# Patient Record
Sex: FEMALE | Race: WHITE | HISPANIC OR LATINO | Employment: FULL TIME | URBAN - METROPOLITAN AREA
[De-identification: names, ages, dates, MRNs, and addresses within clinical notes are randomized per-mention and may not be internally consistent; named-entity substitution may affect disease eponyms.]

---

## 2017-11-30 ENCOUNTER — GENERIC CONVERSION - ENCOUNTER (OUTPATIENT)
Dept: OTHER | Facility: OTHER | Age: 22
End: 2017-11-30

## 2017-12-19 ENCOUNTER — LAB CONVERSION - ENCOUNTER (OUTPATIENT)
Dept: FAMILY MEDICINE CLINIC | Facility: CLINIC | Age: 22
End: 2017-12-19

## 2017-12-19 ENCOUNTER — GENERIC CONVERSION - ENCOUNTER (OUTPATIENT)
Dept: OTHER | Facility: OTHER | Age: 22
End: 2017-12-19

## 2017-12-19 DIAGNOSIS — Z01.419 ENCOUNTER FOR GYNECOLOGICAL EXAMINATION WITHOUT ABNORMAL FINDING: ICD-10-CM

## 2017-12-21 ENCOUNTER — GENERIC CONVERSION - ENCOUNTER (OUTPATIENT)
Dept: OTHER | Facility: OTHER | Age: 22
End: 2017-12-21

## 2017-12-21 ENCOUNTER — LAB CONVERSION - ENCOUNTER (OUTPATIENT)
Dept: OTHER | Facility: OTHER | Age: 22
End: 2017-12-21

## 2017-12-21 LAB
ADDITIONAL INFORMATION (HISTORICAL): NORMAL
ADEQUACY: (HISTORICAL): NORMAL
CLINICAL COMMENT (HISTORICAL): NORMAL
COMMENT (HISTORICAL): NORMAL
CYTOTECHNOLOGIST: (HISTORICAL): NORMAL
INTERPRETATION (HISTORICAL): NORMAL
LMP (HISTORICAL): NORMAL
PREV. BX: (HISTORICAL): NORMAL
PREV. PAP (HISTORICAL): NORMAL
SOURCE (HISTORICAL): NORMAL

## 2018-01-22 VITALS
DIASTOLIC BLOOD PRESSURE: 82 MMHG | SYSTOLIC BLOOD PRESSURE: 122 MMHG | TEMPERATURE: 98.5 F | WEIGHT: 129 LBS | BODY MASS INDEX: 22.86 KG/M2 | RESPIRATION RATE: 16 BRPM | OXYGEN SATURATION: 98 % | HEIGHT: 63 IN | HEART RATE: 76 BPM

## 2018-01-23 NOTE — RESULT NOTES
Verified Results  SURESWAB(R) CHLAMYDIA/ Lanice Agreste RNA, TMA [id number] 12:00AM Julieta Mijares     Test Name Result Flag Reference   CHLAMYDIA TRACHOMATIS$RNA, TMA NOT DETECTED  NOT DETECTED   NEISSERIA [address], TMA NOT DETECTED  NOT DETECTED   This test was performed using the [address]  ([facility] )  The analytical performance characteristics of this   assay, when used to test SurePath specimens have  been determined by [employer] TIS PAP REFLEX HPV mRNA E6/E7 [id number] 12:00AM Julieta Erickson     Test Name Result Flag Reference   CLINICAL INFORMATION: None given     LMP: NONE GIVEN     PREV  PAP: NONE GIVEN     PREV  BX: NONE GIVEN     SOURCE: Cervix     STATEMENT OF ADEQUACY:      Satisfactory for evaluation  Endocervical/transformation zone component absent  Age and/or menstrual status not provided   INTERPRETATION/RESULT:      Negative for intraepithelial lesion or malignancy  COMMENT:      Tip of collection device in vial  This Pap test has been evaluated with computer  assisted technology     CYTOTECHNOLOGIST:      DAVID WYNNE(ASCP)  CT screening location: [facility], [address] [place] [address]

## 2018-01-24 VITALS
RESPIRATION RATE: 16 BRPM | OXYGEN SATURATION: 98 % | WEIGHT: 126.38 LBS | BODY MASS INDEX: 22.39 KG/M2 | HEART RATE: 76 BPM | TEMPERATURE: 98.2 F | SYSTOLIC BLOOD PRESSURE: 120 MMHG | DIASTOLIC BLOOD PRESSURE: 80 MMHG | HEIGHT: 63 IN

## 2018-05-04 DIAGNOSIS — Z30.41 SURVEILLANCE FOR BIRTH CONTROL, ORAL CONTRACEPTIVES: Primary | ICD-10-CM

## 2018-05-04 RX ORDER — LEVONORGESTREL AND ETHINYL ESTRADIOL 0.1-0.02MG
KIT ORAL
Qty: 90 TABLET | Refills: 1 | Status: SHIPPED | OUTPATIENT
Start: 2018-05-04 | End: 2018-06-01 | Stop reason: SDUPTHER

## 2018-06-01 DIAGNOSIS — Z30.41 SURVEILLANCE FOR BIRTH CONTROL, ORAL CONTRACEPTIVES: ICD-10-CM

## 2018-06-01 RX ORDER — LEVONORGESTREL AND ETHINYL ESTRADIOL 0.1-0.02MG
1 KIT ORAL DAILY
Qty: 86 TABLET | Refills: 1 | Status: SHIPPED | OUTPATIENT
Start: 2018-06-01 | End: 2018-11-11 | Stop reason: SDUPTHER

## 2018-11-11 DIAGNOSIS — Z30.41 SURVEILLANCE FOR BIRTH CONTROL, ORAL CONTRACEPTIVES: ICD-10-CM

## 2018-11-11 RX ORDER — LEVONORGESTREL AND ETHINYL ESTRADIOL 0.1-0.02MG
KIT ORAL
Qty: 86 TABLET | Refills: 1 | Status: SHIPPED | OUTPATIENT
Start: 2018-11-11 | End: 2019-05-23 | Stop reason: SDUPTHER

## 2018-11-29 RX ORDER — LEVONORGESTREL AND ETHINYL ESTRADIOL 0.1-0.02MG
1 KIT ORAL DAILY
COMMUNITY
End: 2019-05-07

## 2018-12-06 ENCOUNTER — OFFICE VISIT (OUTPATIENT)
Dept: FAMILY MEDICINE CLINIC | Facility: CLINIC | Age: 23
End: 2018-12-06
Payer: COMMERCIAL

## 2018-12-06 VITALS
HEIGHT: 62 IN | OXYGEN SATURATION: 97 % | TEMPERATURE: 98.6 F | DIASTOLIC BLOOD PRESSURE: 90 MMHG | HEART RATE: 99 BPM | WEIGHT: 131 LBS | RESPIRATION RATE: 17 BRPM | BODY MASS INDEX: 24.11 KG/M2 | SYSTOLIC BLOOD PRESSURE: 130 MMHG

## 2018-12-06 DIAGNOSIS — R82.90 ABNORMAL URINALYSIS: ICD-10-CM

## 2018-12-06 DIAGNOSIS — M54.42 CHRONIC BILATERAL LOW BACK PAIN WITH BILATERAL SCIATICA: Primary | ICD-10-CM

## 2018-12-06 DIAGNOSIS — M54.41 CHRONIC BILATERAL LOW BACK PAIN WITH BILATERAL SCIATICA: Primary | ICD-10-CM

## 2018-12-06 DIAGNOSIS — R10.32 INTERMITTENT LEFT LOWER QUADRANT ABDOMINAL PAIN: ICD-10-CM

## 2018-12-06 DIAGNOSIS — G89.29 CHRONIC BILATERAL LOW BACK PAIN WITH BILATERAL SCIATICA: Primary | ICD-10-CM

## 2018-12-06 PROBLEM — M54.50 CHRONIC LOW BACK PAIN WITHOUT SCIATICA: Status: ACTIVE | Noted: 2018-12-06

## 2018-12-06 LAB
SL AMB  POCT GLUCOSE, UA: ABNORMAL
SL AMB LEUKOCYTE ESTERASE,UA: ABNORMAL
SL AMB POCT BILIRUBIN,UA: ABNORMAL
SL AMB POCT BLOOD,UA: ABNORMAL
SL AMB POCT CLARITY,UA: ABNORMAL
SL AMB POCT COLOR,UA: YELLOW
SL AMB POCT KETONES,UA: ABNORMAL
SL AMB POCT NITRITE,UA: ABNORMAL
SL AMB POCT PH,UA: 7
SL AMB POCT SPECIFIC GRAVITY,UA: 1.01
SL AMB POCT URINE PROTEIN: ABNORMAL
SL AMB POCT UROBILINOGEN: ABNORMAL

## 2018-12-06 PROCEDURE — 81002 URINALYSIS NONAUTO W/O SCOPE: CPT | Performed by: FAMILY MEDICINE

## 2018-12-06 PROCEDURE — 99214 OFFICE O/P EST MOD 30 MIN: CPT | Performed by: FAMILY MEDICINE

## 2018-12-06 RX ORDER — IBUPROFEN 600 MG/1
600 TABLET ORAL EVERY 6 HOURS PRN
Qty: 90 TABLET | Refills: 0 | Status: SHIPPED | OUTPATIENT
Start: 2018-12-06 | End: 2020-04-20

## 2018-12-06 RX ORDER — SULFAMETHOXAZOLE AND TRIMETHOPRIM 800; 160 MG/1; MG/1
1 TABLET ORAL EVERY 12 HOURS SCHEDULED
Qty: 14 TABLET | Refills: 0 | Status: SHIPPED | OUTPATIENT
Start: 2018-12-06 | End: 2018-12-13

## 2018-12-06 NOTE — PROGRESS NOTES
Assessment/Plan:       Diagnoses and all orders for this visit:    Chronic bilateral low back pain with bilateral sciatica  -     POCT urine dip  -     Ambulatory referral to Physical Therapy; Future  -     ibuprofen (MOTRIN) 600 mg tablet; Take 1 tablet (600 mg total) by mouth every 6 (six) hours as needed for mild pain or moderate pain    Intermittent left lower quadrant abdominal pain  -     sulfamethoxazole-trimethoprim (BACTRIM DS) 800-160 mg per tablet; Take 1 tablet by mouth every 12 (twelve) hours for 7 days  -     ibuprofen (MOTRIN) 600 mg tablet; Take 1 tablet (600 mg total) by mouth every 6 (six) hours as needed for mild pain or moderate pain    Abnormal urinalysis  -     sulfamethoxazole-trimethoprim (BACTRIM DS) 800-160 mg per tablet; Take 1 tablet by mouth every 12 (twelve) hours for 7 days          Subjective:   Chief Complaint   Patient presents with    Back Pain     Lower back pain since the summer  Pain is almost every day  Some days pain radiates down legs   Abdominal Pain     Sharp pains lower abdomin for past couple weeks         Patient ID: Aristides Uriarte is a 26 y o  female      Here with her mom c/o abd pain couple of weeks, lower stomach, then also past couple of days sharp and burning pains on the left side  No meds tried  Low back pain for several months- since the summer- 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, sx gradual onset, first noticed when she would bend down, and has been worsening progressively  States she bought a back brace which helps, but she still gets pains  Also states she has been getting help so she doesn't have to do the heavy lifting at work  No meds used  Per chart review- pt is due for PAP- last was 12/2017, and is on OCP  bp elevated today=- has been normal at prior visits here per chart        The following portions of the patient's history were reviewed and updated as appropriate: allergies, current medications, past family history, past medical history, past social history, past surgical history and problem list     Review of Systems   Constitutional: Negative  Respiratory: Negative  Cardiovascular: Negative  Gastrointestinal: Negative for abdominal distention, blood in stool, constipation, diarrhea, nausea, rectal pain and vomiting  Per hpi   Genitourinary: Negative for decreased urine volume, difficulty urinating, dysuria, frequency, hematuria and urgency  Musculoskeletal: Negative for gait problem and joint swelling  Per hpi   Neurological: Negative  Objective:      /90 (BP Location: Left arm, Patient Position: Sitting, Cuff Size: Adult)   Pulse 99   Temp 98 6 °F (37 °C) (Tympanic)   Resp 17   Ht 5' 1 75" (1 568 m)   Wt 59 4 kg (131 lb)   SpO2 97%   BMI 24 15 kg/m²          Physical Exam   Constitutional: She is oriented to person, place, and time  She appears well-developed and well-nourished  She is cooperative  Non-toxic appearance  She does not have a sickly appearance  She does not appear ill  No distress  HENT:   Mouth/Throat: Uvula is midline, oropharynx is clear and moist and mucous membranes are normal    Neck: Trachea normal and normal range of motion  Neck supple  No thyroid mass and no thyromegaly present  Cardiovascular: Normal rate, regular rhythm, normal heart sounds and normal pulses  Pulmonary/Chest: Effort normal and breath sounds normal    Abdominal: Soft  Bowel sounds are normal  She exhibits no distension, no abdominal bruit and no mass  There is no hepatosplenomegaly  There is tenderness (mild b/l lower quad)  There is no rigidity, no rebound, no guarding, no CVA tenderness, no tenderness at McBurney's point and negative Ramirez's sign  No hernia  Musculoskeletal:        Thoracic back: She exhibits normal range of motion, no tenderness, no bony tenderness, no swelling, no edema, no deformity and no spasm          Lumbar back: She exhibits decreased range of motion, tenderness and pain (with flexion)  She exhibits no bony tenderness, no swelling, no edema, no deformity and no spasm  B/l lateral lower lumbar and Right si joint tenderness  +SLR Left   Lymphadenopathy:     She has no cervical adenopathy  Right: No inguinal and no supraclavicular adenopathy present  Left: No inguinal and no supraclavicular adenopathy present  Neurological: She is alert and oriented to person, place, and time  She has normal strength and normal reflexes  She displays no atrophy and no tremor  No sensory deficit  Gait normal    Skin: Skin is warm and dry  She is not diaphoretic  No cyanosis  No pallor  Psychiatric: Her behavior is normal  Her mood appears anxious (mildly)  Her affect is not angry, not blunt, not labile and not inappropriate  She does not exhibit a depressed mood

## 2019-01-17 ENCOUNTER — OFFICE VISIT (OUTPATIENT)
Dept: FAMILY MEDICINE CLINIC | Facility: CLINIC | Age: 24
End: 2019-01-17
Payer: COMMERCIAL

## 2019-01-17 VITALS
OXYGEN SATURATION: 99 % | WEIGHT: 133 LBS | HEART RATE: 91 BPM | TEMPERATURE: 97.7 F | HEIGHT: 63 IN | BODY MASS INDEX: 23.57 KG/M2 | DIASTOLIC BLOOD PRESSURE: 66 MMHG | SYSTOLIC BLOOD PRESSURE: 114 MMHG

## 2019-01-17 DIAGNOSIS — G89.29 CHRONIC BILATERAL LOW BACK PAIN WITH BILATERAL SCIATICA: Primary | ICD-10-CM

## 2019-01-17 DIAGNOSIS — M54.32 BILATERAL SCIATICA: ICD-10-CM

## 2019-01-17 DIAGNOSIS — R10.32 INTERMITTENT LEFT LOWER QUADRANT ABDOMINAL PAIN: ICD-10-CM

## 2019-01-17 DIAGNOSIS — M54.41 CHRONIC BILATERAL LOW BACK PAIN WITH BILATERAL SCIATICA: Primary | ICD-10-CM

## 2019-01-17 DIAGNOSIS — M54.42 CHRONIC BILATERAL LOW BACK PAIN WITH BILATERAL SCIATICA: Primary | ICD-10-CM

## 2019-01-17 DIAGNOSIS — M54.31 BILATERAL SCIATICA: ICD-10-CM

## 2019-01-17 DIAGNOSIS — R31.29 MICROSCOPIC HEMATURIA: ICD-10-CM

## 2019-01-17 LAB
SL AMB  POCT GLUCOSE, UA: ABNORMAL
SL AMB LEUKOCYTE ESTERASE,UA: ABNORMAL
SL AMB POCT BILIRUBIN,UA: ABNORMAL
SL AMB POCT BLOOD,UA: ABNORMAL
SL AMB POCT CLARITY,UA: CLEAR
SL AMB POCT COLOR,UA: YELLOW
SL AMB POCT KETONES,UA: ABNORMAL
SL AMB POCT NITRITE,UA: ABNORMAL
SL AMB POCT PH,UA: 6
SL AMB POCT SPECIFIC GRAVITY,UA: 1.02
SL AMB POCT URINE PROTEIN: ABNORMAL
SL AMB POCT UROBILINOGEN: ABNORMAL

## 2019-01-17 PROCEDURE — 99214 OFFICE O/P EST MOD 30 MIN: CPT | Performed by: FAMILY MEDICINE

## 2019-01-17 PROCEDURE — 81002 URINALYSIS NONAUTO W/O SCOPE: CPT | Performed by: FAMILY MEDICINE

## 2019-01-17 PROCEDURE — 87086 URINE CULTURE/COLONY COUNT: CPT | Performed by: FAMILY MEDICINE

## 2019-01-17 NOTE — PROGRESS NOTES
Assessment/Plan:         Diagnoses and all orders for this visit:    Chronic bilateral low back pain with bilateral sciatica  -     Ambulatory referral to Orthopedic Surgery; Future  -     MRI lumbar spine wo contrast; Future    Bilateral sciatica  Comments:  new problem, ordered MRI and ortho eval   Orders:  -     Ambulatory referral to Orthopedic Surgery; Future  -     MRI lumbar spine wo contrast; Future    Intermittent left lower quadrant abdominal pain  Comments:  with heme on urine dip today, culture sent  Orders:  -     MRI lumbar spine wo contrast; Future  -     POCT urine dip  -     Urine culture; Future  -     Urine culture    Microscopic hematuria  Comments:  urine sent for culture  Orders:  -     Urine culture; Future  -     Urine culture          Subjective:   Chief Complaint   Patient presents with    Well Check     unable to go to pt because of insurance/ but had accupuncture        Patient ID: Ronel Arias is a 22 y o  female  Today's visit is f/u of last month- seen for chronic back pain sx without sciatica  Was rx'd PT and NSAID, but pt states today that insurance didn't cover PT, and she couldn't pay for it out of pocket, so went for acupuncture- feels better the day she goes, "a little relief,"  but then starts hurting the next day  Has had 4 acupuncture sessions over past month  Then 2 days ago "pain was very bad while at work, even hurt to stand, and for first time it hurt to walk and pain shooting down my legs," and states still getting sharp pains in the front (lower abd on the left)  Pain in legs persists since 2 days ago, only relieved when she is in bed at night, all worse on the left  LMP was 01/09/2019        The following portions of the patient's history were reviewed and updated as appropriate: allergies, current medications, past family history, past medical history, past social history, past surgical history and problem list     Review of Systems   Constitutional: Negative  Gastrointestinal: Negative  Genitourinary: Negative for decreased urine volume, difficulty urinating, dysuria, flank pain, frequency, hematuria, menstrual problem and vaginal discharge  Musculoskeletal: Negative for joint swelling  Per hpi   Skin: Negative  Neurological: Negative  Objective:      /66 (BP Location: Left arm, Patient Position: Sitting, Cuff Size: Standard)   Pulse 91   Temp 97 7 °F (36 5 °C) (Tympanic)   Ht 5' 2 5" (1 588 m)   Wt 60 3 kg (133 lb)   SpO2 99%   BMI 23 94 kg/m²          Physical Exam   Constitutional: She is oriented to person, place, and time  She appears well-developed and well-nourished  She is cooperative  Non-toxic appearance  She does not have a sickly appearance  She does not appear ill  No distress  Musculoskeletal:        Right hip: Normal         Left hip: Normal         Lumbar back: She exhibits decreased range of motion and tenderness  She exhibits no bony tenderness, no swelling, no edema, no deformity and no spasm  +SLR left >right   Neurological: She is alert and oriented to person, place, and time  She has normal strength and normal reflexes  She displays no atrophy and no tremor  No sensory deficit  Gait normal    Skin: Skin is warm and dry  She is not diaphoretic  No pallor  Psychiatric: She has a normal mood and affect  Her behavior is normal    Nursing note and vitals reviewed

## 2019-01-18 LAB — BACTERIA UR CULT: NORMAL

## 2019-01-21 ENCOUNTER — TELEPHONE (OUTPATIENT)
Dept: FAMILY MEDICINE CLINIC | Facility: CLINIC | Age: 24
End: 2019-01-21

## 2019-02-04 ENCOUNTER — TELEPHONE (OUTPATIENT)
Dept: FAMILY MEDICINE CLINIC | Facility: CLINIC | Age: 24
End: 2019-02-04

## 2019-02-04 NOTE — TELEPHONE ENCOUNTER
Father called to follow up with MRI results from 1/29/2019   She went to [facility], as of now no results back yet

## 2019-02-06 NOTE — TELEPHONE ENCOUNTER
Mom was in the office for an appt today  She questioned the results of MRI for patient         Scanned under Media tab

## 2019-02-27 ENCOUNTER — OFFICE VISIT (OUTPATIENT)
Dept: OBGYN CLINIC | Facility: CLINIC | Age: 24
End: 2019-02-27
Payer: COMMERCIAL

## 2019-02-27 VITALS
BODY MASS INDEX: 24.48 KG/M2 | HEART RATE: 76 BPM | HEIGHT: 62 IN | WEIGHT: 133 LBS | DIASTOLIC BLOOD PRESSURE: 79 MMHG | SYSTOLIC BLOOD PRESSURE: 118 MMHG

## 2019-02-27 DIAGNOSIS — M54.31 BILATERAL SCIATICA: ICD-10-CM

## 2019-02-27 DIAGNOSIS — M54.42 CHRONIC BILATERAL LOW BACK PAIN WITH BILATERAL SCIATICA: ICD-10-CM

## 2019-02-27 DIAGNOSIS — G89.29 CHRONIC BILATERAL LOW BACK PAIN WITH BILATERAL SCIATICA: ICD-10-CM

## 2019-02-27 DIAGNOSIS — M54.41 CHRONIC BILATERAL LOW BACK PAIN WITH BILATERAL SCIATICA: ICD-10-CM

## 2019-02-27 DIAGNOSIS — M54.32 BILATERAL SCIATICA: ICD-10-CM

## 2019-02-27 PROCEDURE — 99203 OFFICE O/P NEW LOW 30 MIN: CPT | Performed by: ORTHOPAEDIC SURGERY

## 2019-02-27 NOTE — ASSESSMENT & PLAN NOTE
Patient presents for evaluation of several months duration of lower back and leg pains  Fortunately she reports significant improvement in the last several weeks  She now describes 2/10 pain  Pain is primarily localized to the lumbosacral spine bilaterally  Denies any distal leg involvement today  Does not report incontinence of bowel or bladder  Denies alonso weakness  She is yet to try physical therapy or chiropractor  She takes ibuprofen as needed  On physical exam she appears stated age in well-developed in no acute distress  Her gait is normal   She has good lumbar mobility with flexion and extension  She is tender to palpation of the lumbosacral spine minimally  5/5 strength L2-S1 bilaterally  Sensation is grossly intact to light touch  Negative modified straight leg raise bilaterally  Reflexes are 2+ at L4 and S1 symmetrically  She does not present with the MRI of her lumbar spine from outside facility for my review  Assessment and plan    Myofascial back pain on today's exam   No gross neurological deficits  No evidence of radicular symptoms currently  Physical therapy  Follow-up p r n

## 2019-02-27 NOTE — PROGRESS NOTES
Assessment/Plan:    Chronic bilateral low back pain with bilateral sciatica  Patient presents for evaluation of several months duration of lower back and leg pains  Fortunately she reports significant improvement in the last several weeks  She now describes 2/10 pain  Pain is primarily localized to the lumbosacral spine bilaterally  Denies any distal leg involvement today  Does not report incontinence of bowel or bladder  Denies alonso weakness  She is yet to try physical therapy or chiropractor  She takes ibuprofen as needed  On physical exam she appears stated age in well-developed in no acute distress  Her gait is normal   She has good lumbar mobility with flexion and extension  She is tender to palpation of the lumbosacral spine minimally  5/5 strength L2-S1 bilaterally  Sensation is grossly intact to light touch  Negative modified straight leg raise bilaterally  Reflexes are 2+ at L4 and S1 symmetrically  She does not present with the MRI of her lumbar spine from outside facility for my review  Assessment and plan    Myofascial back pain on today's exam   No gross neurological deficits  No evidence of radicular symptoms currently  Physical therapy  Follow-up p r n  · Low back pain with anteromedial thigh and groin pain  · Symptoms for 8 months  · Patient has Lumbar MRI yet did not bring disc  · Start physical therapy  · Follow up PRN     Problem List Items Addressed This Visit        Nervous and Auditory    Chronic bilateral low back pain with bilateral sciatica     Patient presents for evaluation of several months duration of lower back and leg pains  Fortunately she reports significant improvement in the last several weeks  She now describes 2/10 pain  Pain is primarily localized to the lumbosacral spine bilaterally  Denies any distal leg involvement today  Does not report incontinence of bowel or bladder  Denies alonso weakness    She is yet to try physical therapy or chiropractor  She takes ibuprofen as needed  On physical exam she appears stated age in well-developed in no acute distress  Her gait is normal   She has good lumbar mobility with flexion and extension  She is tender to palpation of the lumbosacral spine minimally  5/5 strength L2-S1 bilaterally  Sensation is grossly intact to light touch  Negative modified straight leg raise bilaterally  Reflexes are 2+ at L4 and S1 symmetrically  She does not present with the MRI of her lumbar spine from outside facility for my review  Assessment and plan    Myofascial back pain on today's exam   No gross neurological deficits  No evidence of radicular symptoms currently  Physical therapy  Follow-up p r n  Relevant Orders    Ambulatory referral to Physical Therapy    Bilateral sciatica    Relevant Orders    Ambulatory referral to Physical Therapy            Subjective:      Patient ID: Zcak Hurley is a 22 y o  female  HPI   The patient is here for initial evaluation of low back pain  She is here from [facility]  She has had symptoms for 8 months  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  Today she complains of bilateral low back pain with bilateral groin pain extending into the medial bilateral thighs  She denies numbness or tingling  She rates her pain at 2/10 and 9/10 at its worse  Standing prolonged, lifting any weight aggravates  Sitting or lying aggravates  She does use ibuprofen 600mg occasionally  She has tried acupuncture with no benefit  She has not done physical therapy due to insurance  She denies lumbar injections or surgery        The following portions of the patient's history were reviewed and updated as appropriate: allergies, current medications, past family history, past medical history, past social history, past surgical history and problem list     Review of Systems   Constitutional: Negative for chills, fever and unexpected weight change  HENT: Negative for hearing loss, nosebleeds and sore throat  Eyes: Negative for pain, redness and visual disturbance  Respiratory: Negative for cough, shortness of breath and wheezing  Cardiovascular: Negative for chest pain, palpitations and leg swelling  Gastrointestinal: Negative for abdominal pain, nausea and vomiting  Genitourinary: Negative for dyspareunia, dysuria and frequency  Skin: Negative for rash and wound  Neurological: Negative for dizziness, numbness and headaches  Psychiatric/Behavioral: Negative for decreased concentration and suicidal ideas  The patient is not nervous/anxious  Objective:      /79   Pulse 76   Ht 5' 2" (1 575 m)   Wt 60 3 kg (133 lb)   BMI 24 33 kg/m²          Physical Exam   Constitutional: She is oriented to person, place, and time  She appears well-developed and well-nourished  HENT:   Head: Normocephalic  Eyes: Conjunctivae are normal    Neck: Normal range of motion  Cardiovascular: Normal rate  Pulmonary/Chest: Effort normal    Neurological: She is alert and oriented to person, place, and time  Skin: Skin is warm and dry         Patient ambulates without assistance  Tender to palpation: none  Patient able to touch toes without increase of pain  Modified straight leg raise negative bilaterally  Strength L2-S1 5/5 bilaterally   Sensation L2-S1 intact bilaterally  Reflexes 2+ at L4 and 2+ at S1  Nathaniel's test negative bilaterally    Imaging:  None taken [email or web address]    I,:   Francesca Church am acting as a scribe while in the presence of the attending physician :        I,:   Fifi Rodriguez MD personally performed the services described in this documentation    as scribed in my presence :

## 2019-05-07 ENCOUNTER — OFFICE VISIT (OUTPATIENT)
Dept: FAMILY MEDICINE CLINIC | Facility: CLINIC | Age: 24
End: 2019-05-07
Payer: COMMERCIAL

## 2019-05-07 VITALS
WEIGHT: 132 LBS | DIASTOLIC BLOOD PRESSURE: 82 MMHG | BODY MASS INDEX: 24.29 KG/M2 | OXYGEN SATURATION: 97 % | RESPIRATION RATE: 16 BRPM | HEART RATE: 92 BPM | TEMPERATURE: 97.8 F | HEIGHT: 62 IN | SYSTOLIC BLOOD PRESSURE: 118 MMHG

## 2019-05-07 DIAGNOSIS — Z02.0 SCHOOL PHYSICAL EXAM: Primary | ICD-10-CM

## 2019-05-07 DIAGNOSIS — Z23 NEED FOR VACCINATION FOR DTAP: ICD-10-CM

## 2019-05-07 DIAGNOSIS — R10.2 PELVIC CRAMPING: ICD-10-CM

## 2019-05-07 PROCEDURE — 90715 TDAP VACCINE 7 YRS/> IM: CPT

## 2019-05-07 PROCEDURE — 99395 PREV VISIT EST AGE 18-39: CPT | Performed by: PHYSICIAN ASSISTANT

## 2019-05-07 PROCEDURE — 90471 IMMUNIZATION ADMIN: CPT

## 2019-05-10 ENCOUNTER — HOSPITAL ENCOUNTER (OUTPATIENT)
Dept: ULTRASOUND IMAGING | Facility: HOSPITAL | Age: 24
Discharge: HOME | End: 2019-05-10
Payer: COMMERCIAL

## 2019-05-10 DIAGNOSIS — R10.2 PELVIC CRAMPING: ICD-10-CM

## 2019-05-10 PROCEDURE — 76830 TRANSVAGINAL US NON-OB: CPT

## 2019-05-10 PROCEDURE — 76856 US EXAM PELVIC COMPLETE: CPT

## 2019-05-23 DIAGNOSIS — Z30.41 SURVEILLANCE FOR BIRTH CONTROL, ORAL CONTRACEPTIVES: ICD-10-CM

## 2019-05-27 RX ORDER — LEVONORGESTREL AND ETHINYL ESTRADIOL 0.1-0.02MG
KIT ORAL
Qty: 86 TABLET | Refills: 1 | Status: SHIPPED | OUTPATIENT
Start: 2019-05-27 | End: 2019-11-02 | Stop reason: SDUPTHER

## 2019-11-02 DIAGNOSIS — Z30.41 SURVEILLANCE FOR BIRTH CONTROL, ORAL CONTRACEPTIVES: ICD-10-CM

## 2019-11-04 RX ORDER — LEVONORGESTREL AND ETHINYL ESTRADIOL 0.1-0.02MG
KIT ORAL
Qty: 84 TABLET | Refills: 1 | Status: SHIPPED | OUTPATIENT
Start: 2019-11-04 | End: 2020-03-30 | Stop reason: SDUPTHER

## 2020-03-24 DIAGNOSIS — Z30.41 SURVEILLANCE FOR BIRTH CONTROL, ORAL CONTRACEPTIVES: ICD-10-CM

## 2020-03-30 DIAGNOSIS — Z30.41 SURVEILLANCE FOR BIRTH CONTROL, ORAL CONTRACEPTIVES: ICD-10-CM

## 2020-03-30 RX ORDER — LEVONORGESTREL AND ETHINYL ESTRADIOL 0.1-0.02MG
1 KIT ORAL DAILY
Qty: 84 TABLET | Refills: 1 | OUTPATIENT
Start: 2020-03-30

## 2020-03-30 RX ORDER — LEVONORGESTREL AND ETHINYL ESTRADIOL 0.1-0.02MG
1 KIT ORAL DAILY
Qty: 84 TABLET | Refills: 1 | Status: SHIPPED | OUTPATIENT
Start: 2020-03-30 | End: 2020-10-18

## 2020-04-01 NOTE — TELEPHONE ENCOUNTER
Pt returned call- she does not know her schedule for next month  She will call us when she is ready to schedule

## 2020-04-20 ENCOUNTER — TELEMEDICINE (OUTPATIENT)
Dept: FAMILY MEDICINE CLINIC | Facility: CLINIC | Age: 25
End: 2020-04-20
Payer: COMMERCIAL

## 2020-04-20 DIAGNOSIS — Z13.220 LIPID SCREENING: ICD-10-CM

## 2020-04-20 DIAGNOSIS — Z11.4 ENCOUNTER FOR SCREENING FOR HIV: ICD-10-CM

## 2020-04-20 DIAGNOSIS — R10.2 PELVIC PAIN: ICD-10-CM

## 2020-04-20 DIAGNOSIS — Z13.31 NEGATIVE DEPRESSION SCREENING: ICD-10-CM

## 2020-04-20 DIAGNOSIS — Z30.41 ORAL CONTRACEPTIVE PILL SURVEILLANCE: Primary | ICD-10-CM

## 2020-04-20 DIAGNOSIS — Z13.1 DIABETES MELLITUS SCREENING: ICD-10-CM

## 2020-04-20 DIAGNOSIS — Z13.29 THYROID DISORDER SCREEN: ICD-10-CM

## 2020-04-20 DIAGNOSIS — Z13.0 SCREENING FOR DEFICIENCY ANEMIA: ICD-10-CM

## 2020-04-20 PROCEDURE — 99213 OFFICE O/P EST LOW 20 MIN: CPT | Performed by: PHYSICIAN ASSISTANT

## 2020-06-23 ENCOUNTER — OFFICE VISIT (OUTPATIENT)
Dept: FAMILY MEDICINE CLINIC | Facility: CLINIC | Age: 25
End: 2020-06-23
Payer: COMMERCIAL

## 2020-06-23 VITALS
OXYGEN SATURATION: 100 % | WEIGHT: 135 LBS | BODY MASS INDEX: 24.84 KG/M2 | RESPIRATION RATE: 17 BRPM | HEART RATE: 73 BPM | HEIGHT: 62 IN | DIASTOLIC BLOOD PRESSURE: 80 MMHG | TEMPERATURE: 97.8 F | SYSTOLIC BLOOD PRESSURE: 124 MMHG

## 2020-06-23 DIAGNOSIS — Z30.41 ORAL CONTRACEPTIVE PILL SURVEILLANCE: ICD-10-CM

## 2020-06-23 DIAGNOSIS — Z00.00 ANNUAL PHYSICAL EXAM: Primary | ICD-10-CM

## 2020-06-23 DIAGNOSIS — Z00.00 LABORATORY EXAM ORDERED AS PART OF ROUTINE GENERAL MEDICAL EXAMINATION: ICD-10-CM

## 2020-06-23 DIAGNOSIS — R10.2 PELVIC CRAMPING: ICD-10-CM

## 2020-06-23 DIAGNOSIS — Z01.419 ENCOUNTER FOR GYNECOLOGICAL EXAMINATION WITHOUT ABNORMAL FINDING: ICD-10-CM

## 2020-06-23 PROCEDURE — 3008F BODY MASS INDEX DOCD: CPT | Performed by: NURSE PRACTITIONER

## 2020-06-23 PROCEDURE — 99395 PREV VISIT EST AGE 18-39: CPT | Performed by: PHYSICIAN ASSISTANT

## 2020-07-31 ENCOUNTER — APPOINTMENT (OUTPATIENT)
Dept: LAB | Age: 25
End: 2020-07-31
Payer: COMMERCIAL

## 2020-07-31 DIAGNOSIS — Z13.0 SCREENING FOR DEFICIENCY ANEMIA: ICD-10-CM

## 2020-07-31 DIAGNOSIS — Z13.1 DIABETES MELLITUS SCREENING: ICD-10-CM

## 2020-07-31 DIAGNOSIS — Z13.29 THYROID DISORDER SCREEN: ICD-10-CM

## 2020-07-31 DIAGNOSIS — Z11.4 ENCOUNTER FOR SCREENING FOR HIV: ICD-10-CM

## 2020-07-31 DIAGNOSIS — Z13.220 LIPID SCREENING: ICD-10-CM

## 2020-07-31 LAB
ALBUMIN SERPL BCP-MCNC: 3.7 G/DL (ref 3.5–5)
ALP SERPL-CCNC: 54 U/L (ref 46–116)
ALT SERPL W P-5'-P-CCNC: 21 U/L (ref 12–78)
ANION GAP SERPL CALCULATED.3IONS-SCNC: 7 MMOL/L (ref 4–13)
AST SERPL W P-5'-P-CCNC: 14 U/L (ref 5–45)
BASOPHILS # BLD AUTO: 0.08 THOUSANDS/ΜL (ref 0–0.1)
BASOPHILS NFR BLD AUTO: 1 % (ref 0–1)
BILIRUB SERPL-MCNC: 0.41 MG/DL (ref 0.2–1)
BUN SERPL-MCNC: 9 MG/DL (ref 5–25)
CALCIUM SERPL-MCNC: 9.2 MG/DL (ref 8.3–10.1)
CHLORIDE SERPL-SCNC: 107 MMOL/L (ref 100–108)
CHOLEST SERPL-MCNC: 128 MG/DL (ref 50–200)
CO2 SERPL-SCNC: 25 MMOL/L (ref 21–32)
CREAT SERPL-MCNC: 0.84 MG/DL (ref 0.6–1.3)
EOSINOPHIL # BLD AUTO: 0.32 THOUSAND/ΜL (ref 0–0.61)
EOSINOPHIL NFR BLD AUTO: 6 % (ref 0–6)
ERYTHROCYTE [DISTWIDTH] IN BLOOD BY AUTOMATED COUNT: 12.2 % (ref 11.6–15.1)
GFR SERPL CREATININE-BSD FRML MDRD: 98 ML/MIN/1.73SQ M
GLUCOSE P FAST SERPL-MCNC: 122 MG/DL (ref 65–99)
HCT VFR BLD AUTO: 44.9 % (ref 34.8–46.1)
HDLC SERPL-MCNC: 42 MG/DL
HGB BLD-MCNC: 14.7 G/DL (ref 11.5–15.4)
IMM GRANULOCYTES # BLD AUTO: 0.02 THOUSAND/UL (ref 0–0.2)
IMM GRANULOCYTES NFR BLD AUTO: 0 % (ref 0–2)
LDLC SERPL CALC-MCNC: 68 MG/DL (ref 0–100)
LYMPHOCYTES # BLD AUTO: 1.98 THOUSANDS/ΜL (ref 0.6–4.47)
LYMPHOCYTES NFR BLD AUTO: 35 % (ref 14–44)
MCH RBC QN AUTO: 29.3 PG (ref 26.8–34.3)
MCHC RBC AUTO-ENTMCNC: 32.7 G/DL (ref 31.4–37.4)
MCV RBC AUTO: 90 FL (ref 82–98)
MONOCYTES # BLD AUTO: 0.27 THOUSAND/ΜL (ref 0.17–1.22)
MONOCYTES NFR BLD AUTO: 5 % (ref 4–12)
NEUTROPHILS # BLD AUTO: 3.05 THOUSANDS/ΜL (ref 1.85–7.62)
NEUTS SEG NFR BLD AUTO: 53 % (ref 43–75)
NONHDLC SERPL-MCNC: 86 MG/DL
NRBC BLD AUTO-RTO: 0 /100 WBCS
PLATELET # BLD AUTO: 378 THOUSANDS/UL (ref 149–390)
PMV BLD AUTO: 10.1 FL (ref 8.9–12.7)
POTASSIUM SERPL-SCNC: 4.8 MMOL/L (ref 3.5–5.3)
PROT SERPL-MCNC: 7.7 G/DL (ref 6.4–8.2)
RBC # BLD AUTO: 5.01 MILLION/UL (ref 3.81–5.12)
SODIUM SERPL-SCNC: 139 MMOL/L (ref 136–145)
TRIGL SERPL-MCNC: 89 MG/DL
TSH SERPL DL<=0.05 MIU/L-ACNC: 2.52 UIU/ML (ref 0.36–3.74)
WBC # BLD AUTO: 5.72 THOUSAND/UL (ref 4.31–10.16)

## 2020-07-31 PROCEDURE — 87389 HIV-1 AG W/HIV-1&-2 AB AG IA: CPT

## 2020-07-31 PROCEDURE — 80053 COMPREHEN METABOLIC PANEL: CPT

## 2020-07-31 PROCEDURE — 36415 COLL VENOUS BLD VENIPUNCTURE: CPT

## 2020-07-31 PROCEDURE — 85025 COMPLETE CBC W/AUTO DIFF WBC: CPT

## 2020-07-31 PROCEDURE — 80061 LIPID PANEL: CPT

## 2020-07-31 PROCEDURE — 84443 ASSAY THYROID STIM HORMONE: CPT

## 2020-08-02 LAB — HIV 1+2 AB+HIV1 P24 AG SERPL QL IA: NORMAL

## 2020-08-05 ENCOUNTER — CLINICAL SUPPORT (OUTPATIENT)
Dept: FAMILY MEDICINE CLINIC | Facility: CLINIC | Age: 25
End: 2020-08-05
Payer: COMMERCIAL

## 2020-08-05 DIAGNOSIS — R73.01 IMPAIRED FASTING GLUCOSE: Primary | ICD-10-CM

## 2020-08-05 LAB — SL AMB POCT HEMOGLOBIN AIC: 4.9 (ref ?–6.5)

## 2020-08-05 PROCEDURE — 99211 OFF/OP EST MAY X REQ PHY/QHP: CPT

## 2020-08-05 PROCEDURE — 1036F TOBACCO NON-USER: CPT

## 2020-08-05 PROCEDURE — 83036 HEMOGLOBIN GLYCOSYLATED A1C: CPT

## 2020-08-06 ENCOUNTER — OFFICE VISIT (OUTPATIENT)
Dept: OBGYN CLINIC | Facility: CLINIC | Age: 25
End: 2020-08-06
Payer: COMMERCIAL

## 2020-08-06 VITALS
WEIGHT: 136 LBS | HEART RATE: 77 BPM | TEMPERATURE: 98 F | SYSTOLIC BLOOD PRESSURE: 100 MMHG | DIASTOLIC BLOOD PRESSURE: 78 MMHG | BODY MASS INDEX: 24.87 KG/M2

## 2020-08-06 DIAGNOSIS — G89.29 CHRONIC BILATERAL LOW BACK PAIN WITH BILATERAL SCIATICA: ICD-10-CM

## 2020-08-06 DIAGNOSIS — M54.42 CHRONIC BILATERAL LOW BACK PAIN WITH BILATERAL SCIATICA: ICD-10-CM

## 2020-08-06 DIAGNOSIS — R10.2 PELVIC PAIN IN FEMALE: Primary | ICD-10-CM

## 2020-08-06 DIAGNOSIS — M54.41 CHRONIC BILATERAL LOW BACK PAIN WITH BILATERAL SCIATICA: ICD-10-CM

## 2020-08-06 PROCEDURE — 1036F TOBACCO NON-USER: CPT | Performed by: NURSE PRACTITIONER

## 2020-08-06 PROCEDURE — 99203 OFFICE O/P NEW LOW 30 MIN: CPT | Performed by: NURSE PRACTITIONER

## 2020-08-06 PROCEDURE — 87491 CHLMYD TRACH DNA AMP PROBE: CPT | Performed by: NURSE PRACTITIONER

## 2020-08-06 PROCEDURE — 87591 N.GONORRHOEAE DNA AMP PROB: CPT | Performed by: NURSE PRACTITIONER

## 2020-08-06 NOTE — PROGRESS NOTES
Assessment/Plan:    1  Pelvic pain in female  Chronic for one year, on OCP  Normal exam   Suspect GI etiology or lower back radiating pain  Rule out infection: g/c obtained  Wet mount negative  Check pelvic US  If all negative would suggest GI evaluation     - US pelvis complete non OB; Future    2  Dysmenorrhea  Well managed by OCP for the past 5 years  Subjective:      Patient ID: Marilyn Clifford is a 26 y o  female  HPI  NEW PATIENT PROBLEM  CC: chronic pelvic pain    27 yo g0 presents for evaluation of pelvic pain  She reports that pain started approximately one year ago, describes as sharp, intermittent, in lower pelvis  It can be bilateral or unilateral, it lasts a couple seconds, ranges from mild to severe  Happening daily  5 2019 pelvic ultrasound normal    GYN history unremarkable, negative for STDs, negative for abnormal paps  Menarche 7, started OCP for dysmenorrhea and for birth control  Has been taking OCP for 5 years with good management of her dysmenorrhea  LMP 7/22, normal   She is sexually active, monogamous relationship  She has a history of lower back pain/ sciatica  12/2017 pap negative    Past Medical History:   Diagnosis Date    Asthma     Dysmenorrhea      Past Surgical History:   Procedure Laterality Date    NO PAST SURGERIES       Current Outpatient Medications on File Prior to Visit   Medication Sig Dispense Refill    levonorgestrel-ethinyl estradiol Stephanie Score) 0 1-20 MG-MCG per tablet Take 1 tablet by mouth daily 84 tablet 1     No current facility-administered medications on file prior to visit        Family History   Problem Relation Age of Onset    Hypertension Mother     Asthma Father     Heart disease Maternal Grandmother     Hypertension Maternal Grandfather     Heart disease Paternal Grandmother     Diabetes Family     Cancer Family     Breast cancer Neg Hx     Colon cancer Neg Hx     Ovarian cancer Neg Hx     Uterine cancer Neg Hx The following portions of the patient's history were reviewed and updated as appropriate: allergies, current medications, past family history, past medical history, past social history, past surgical history and problem list     Review of Systems   Constitutional: Negative for chills and fever  Respiratory: Negative for cough and shortness of breath  Gastrointestinal: Negative for abdominal distention, abdominal pain, constipation, diarrhea, nausea and vomiting  Genitourinary: Positive for pelvic pain  Negative for difficulty urinating, dysuria, frequency, menstrual problem, urgency, vaginal bleeding and vaginal discharge  Musculoskeletal: Negative for arthralgias and myalgias  Objective:    /78 (BP Location: Left arm, Patient Position: Sitting, Cuff Size: Standard)   Pulse 77   Temp 98 °F (36 7 °C) (Tympanic)   Wt 61 7 kg (136 lb)   LMP 07/22/2020 (Exact Date)   BMI 24 87 kg/m²      Wet mount: negative     Physical Exam   Constitutional: She appears well-developed  HENT:   Head: Normocephalic and atraumatic  Eyes: Pupils are equal, round, and reactive to light  Pulmonary/Chest: Effort normal    Abdominal: Soft  Normal appearance  She exhibits no distension and no mass  There is no splenomegaly or hepatomegaly  There is no abdominal tenderness  There is no rebound and no guarding  No hernia  Genitourinary: There is no rash, tenderness, lesion or injury on the right labia  There is no rash, tenderness, lesion or injury on the left labia  Uterus is not enlarged and not tender  Cervix exhibits no motion tenderness, no discharge and no friability  Right adnexum displays no mass, no tenderness and no fullness  Left adnexum displays no mass, no tenderness and no fullness  No vaginal discharge, erythema, tenderness or bleeding  No erythema, tenderness or bleeding in the vagina  No foreign body in the vagina  No signs of injury in the vagina        Genitourinary Comments: No pain reproduced     Neurological: She is alert

## 2020-08-07 LAB
C TRACH DNA SPEC QL NAA+PROBE: NEGATIVE
N GONORRHOEA DNA SPEC QL NAA+PROBE: NEGATIVE

## 2020-08-11 ENCOUNTER — HOSPITAL ENCOUNTER (OUTPATIENT)
Dept: ULTRASOUND IMAGING | Facility: HOSPITAL | Age: 25
Discharge: HOME | End: 2020-08-11
Attending: NURSE PRACTITIONER
Payer: COMMERCIAL

## 2020-08-11 DIAGNOSIS — R10.2 PELVIC PAIN IN FEMALE: ICD-10-CM

## 2020-08-11 PROCEDURE — 76856 US EXAM PELVIC COMPLETE: CPT

## 2020-08-11 PROCEDURE — 76830 TRANSVAGINAL US NON-OB: CPT

## 2020-10-17 DIAGNOSIS — Z30.41 SURVEILLANCE FOR BIRTH CONTROL, ORAL CONTRACEPTIVES: ICD-10-CM

## 2020-10-18 RX ORDER — LEVONORGESTREL AND ETHINYL ESTRADIOL 0.1-0.02MG
KIT ORAL
Qty: 84 TABLET | Refills: 1 | Status: SHIPPED | OUTPATIENT
Start: 2020-10-18 | End: 2020-12-16

## 2020-12-16 ENCOUNTER — TELEMEDICINE (OUTPATIENT)
Dept: FAMILY MEDICINE CLINIC | Facility: CLINIC | Age: 25
End: 2020-12-16
Payer: COMMERCIAL

## 2020-12-16 DIAGNOSIS — T88.7XXA MEDICATION SIDE EFFECTS: Primary | ICD-10-CM

## 2020-12-16 PROCEDURE — 99213 OFFICE O/P EST LOW 20 MIN: CPT | Performed by: PHYSICIAN ASSISTANT

## 2021-09-13 ENCOUNTER — TELEPHONE (OUTPATIENT)
Dept: FAMILY MEDICINE CLINIC | Facility: CLINIC | Age: 26
End: 2021-09-13

## 2021-09-13 NOTE — TELEPHONE ENCOUNTER
09/13/2021  4:10 pm  Patient left message to schedule appointment as she is losing weight and wants to check if something is wrong  Also, yearly physical is overdue - last 06/23/2020

## 2021-09-23 ENCOUNTER — OFFICE VISIT (OUTPATIENT)
Dept: FAMILY MEDICINE CLINIC | Facility: CLINIC | Age: 26
End: 2021-09-23
Payer: COMMERCIAL

## 2021-09-23 VITALS
DIASTOLIC BLOOD PRESSURE: 82 MMHG | SYSTOLIC BLOOD PRESSURE: 124 MMHG | TEMPERATURE: 98.8 F | OXYGEN SATURATION: 99 % | HEIGHT: 62 IN | WEIGHT: 111.2 LBS | BODY MASS INDEX: 20.46 KG/M2 | HEART RATE: 104 BPM

## 2021-09-23 DIAGNOSIS — R68.81 EARLY SATIETY: ICD-10-CM

## 2021-09-23 DIAGNOSIS — R00.2 HEART PALPITATIONS: ICD-10-CM

## 2021-09-23 DIAGNOSIS — R10.11 RUQ PAIN: ICD-10-CM

## 2021-09-23 DIAGNOSIS — F41.9 ANXIETY: ICD-10-CM

## 2021-09-23 DIAGNOSIS — R11.0 NAUSEA: ICD-10-CM

## 2021-09-23 DIAGNOSIS — R63.4 WEIGHT LOSS, UNINTENTIONAL: Primary | ICD-10-CM

## 2021-09-23 PROCEDURE — 3725F SCREEN DEPRESSION PERFORMED: CPT | Performed by: PHYSICIAN ASSISTANT

## 2021-09-23 PROCEDURE — 1036F TOBACCO NON-USER: CPT | Performed by: PHYSICIAN ASSISTANT

## 2021-09-23 PROCEDURE — 99214 OFFICE O/P EST MOD 30 MIN: CPT | Performed by: PHYSICIAN ASSISTANT

## 2021-09-23 PROCEDURE — 3008F BODY MASS INDEX DOCD: CPT | Performed by: PHYSICIAN ASSISTANT

## 2021-09-23 RX ORDER — ONDANSETRON 4 MG/1
4 TABLET, FILM COATED ORAL EVERY 8 HOURS PRN
Qty: 20 TABLET | Refills: 0 | Status: SHIPPED | OUTPATIENT
Start: 2021-09-23 | End: 2022-07-25

## 2021-09-23 RX ORDER — FAMOTIDINE 20 MG/1
20 TABLET, FILM COATED ORAL DAILY
Qty: 60 TABLET | Refills: 0 | Status: SHIPPED | OUTPATIENT
Start: 2021-09-23 | End: 2022-07-25

## 2021-09-23 NOTE — PROGRESS NOTES
Routine Follow-up    Jojo Beal 23 y o  female   Date:  9/23/2021      Assessment and Plan:    Margot was seen today for weight loss and nausea  Diagnoses and all orders for this visit:    Weight loss, unintentional  -     CBC and differential; Future  -     Comprehensive metabolic panel; Future  -     TSH, 3rd generation with Free T4 reflex; Future  -     Lipid panel; Future  -     Iron Panel (Includes Ferritin, Iron Sat%, Iron, and TIBC); Future  -     Hemoglobin A1C; Future  -     Urinalysis with microscopic; Future  -     US abdomen complete; Future  - could be from stopping birth control and also heightened by anxieties   - will r/o etiology through lab and ultrasound and recheck in 4 weeks   - wt is stable     Heart palpitations  -     Iron Panel (Includes Ferritin, Iron Sat%, Iron, and TIBC); Future  - limit caffeine   - stress and anxiety can be etiology    RUQ pain  -     US abdomen complete; Future    Early satiety  -     US abdomen complete; Future    Nausea  -     famotidine (PEPCID) 20 mg tablet; Take 1 tablet (20 mg total) by mouth daily  -     ondansetron (ZOFRAN) 4 mg tablet; Take 1 tablet (4 mg total) by mouth every 8 (eight) hours as needed for nausea or vomiting  - could be aggravated by anxiety   - obtain labs and follow up in 1 month   - consider treating anxiety and GI consult if unremarkable    Anxiety  - smoking marijuana helps, defers any medication management   - has good support, starting therapy with boyfriend       Depression Screening and Follow-up Plan:   Patient was screened for depression during today's encounter  They screened negative with a PHQ-2 score of 0  HPI:  Chief Complaint   Patient presents with    Weight Loss      has been losing wt-since going off BC since January, has lost 36 lbs    Nausea     Diminished appetite, no interest in eating     HPI   Patient is a healthy 22 yo female who presents with unintentional weight loss and nausea     Patient was following with KAYLEE on OCP  She was around 112 prior to starting birth control  She was on OCP for 5 years, she gained weight up to 135 and had good apetite  She stopped birth control in Dec and after two months of stopping, she has minimal appeite  She has lost 25 lbs  She seems to be plateaued around 861-156  She has nausea and often wont feel fungry  Then when sehe is hungry and take 1 bite and then feel full and not hungry any more  She typically only eats 1 meal a day  The only thing that helps her nausea and to eat at least 1 meal at night is to smoke marijuana at night  No vomiting, no heart burn  She burps sometimes  Sometimes she will get cramping across upper abdomen and she notices occasional RUQ pain  No loose stools, no constipation, no change in color of stool, no blood in stool  She is working night shift now, makes her tired but otherwise no sig fatigue  Her periods last about 6-7 days  The cramping is not bad like it was when she wanted to start birth control many years ago  Sometimes regular, then some months will come late  She does admit to struggling with anxiety every day  She is doing therapy with her boyfriend and has good support  She is hesitant to medication for anxiety  Marijuana helps her relax and calm down every night  ROS: Review of Systems   Constitutional: Positive for appetite change and unexpected weight change  Negative for activity change, diaphoresis and fever  Eyes: Negative for visual disturbance  Respiratory: Negative  Cardiovascular: Positive for palpitations (random )  Negative for chest pain and leg swelling  Gastrointestinal: Positive for abdominal pain and nausea  Negative for abdominal distention, blood in stool, constipation, diarrhea and vomiting  Endocrine: Negative for polydipsia and polyuria  Genitourinary: Negative for hematuria  Musculoskeletal: Negative for arthralgias, joint swelling and myalgias     Skin: Negative for color change and rash  Neurological: Negative for dizziness, syncope and weakness  Psychiatric/Behavioral: Negative for dysphoric mood and suicidal ideas  Sleep disturbance: night shift  The patient is nervous/anxious  Past Medical History:   Diagnosis Date    Asthma     Dysmenorrhea      Patient Active Problem List   Diagnosis    Chronic bilateral low back pain with bilateral sciatica    Intermittent left lower quadrant abdominal pain    Bilateral sciatica    Asthma    Dysmenorrhea    Pelvic pain in female       Past Surgical History:   Procedure Laterality Date    NO PAST SURGERIES         Social History     Socioeconomic History    Marital status: Single     Spouse name: None    Number of children: None    Years of education: None    Highest education level: None   Occupational History    None   Tobacco Use    Smoking status: Never Smoker    Smokeless tobacco: Never Used   Vaping Use    Vaping Use: Never used   Substance and Sexual Activity    Alcohol use: Yes     Comment: Social    Drug use: No    Sexual activity: Yes     Partners: Male     Birth control/protection: OCP   Other Topics Concern    None   Social History Narrative    Uses seatbelts     Social Determinants of Health     Financial Resource Strain:     Difficulty of Paying Living Expenses:    Food Insecurity:     Worried About Running Out of Food in the Last Year:     Ran Out of Food in the Last Year:    Transportation Needs:     Lack of Transportation (Medical):      Lack of Transportation (Non-Medical):    Physical Activity:     Days of Exercise per Week:     Minutes of Exercise per Session:    Stress:     Feeling of Stress :    Social Connections:     Frequency of Communication with Friends and Family:     Frequency of Social Gatherings with Friends and Family:     Attends Scientologist Services:     Active Member of Clubs or Organizations:     Attends Club or Organization Meetings:     Marital Status:    Intimate Partner Violence:     Fear of Current or Ex-Partner:     Emotionally Abused:     Physically Abused:     Sexually Abused:        Family History   Problem Relation Age of Onset    Hypertension Mother     Asthma Father     Heart disease Maternal Grandmother     Hypertension Maternal Grandfather     Heart disease Paternal Grandmother     Diabetes Family     Cancer Family     Breast cancer Neg Hx     Colon cancer Neg Hx     Ovarian cancer Neg Hx     Uterine cancer Neg Hx        Allergies   Allergen Reactions    Cat Hair Extract     Dust Mite Extract     Other Allergic Rhinitis         Current Outpatient Medications:     famotidine (PEPCID) 20 mg tablet, Take 1 tablet (20 mg total) by mouth daily, Disp: 60 tablet, Rfl: 0    ondansetron (ZOFRAN) 4 mg tablet, Take 1 tablet (4 mg total) by mouth every 8 (eight) hours as needed for nausea or vomiting, Disp: 20 tablet, Rfl: 0    PHQ-9 Depression Screening    PHQ-9:   Frequency of the following problems over the past two weeks:      Little interest or pleasure in doing things: 0 - not at all  Feeling down, depressed, or hopeless: 0 - not at all  PHQ-2 Score: 0         Physical Exam:  /82 (BP Location: Left arm, Patient Position: Sitting, Cuff Size: Adult)   Pulse 104   Temp 98 8 °F (37 1 °C) (Tympanic)   Ht 5' 2" (1 575 m)   Wt 50 4 kg (111 lb 3 2 oz)   SpO2 99%   BMI 20 34 kg/m²     Physical Exam  Constitutional:       General: She is not in acute distress  Appearance: Normal appearance  She is not ill-appearing or diaphoretic  HENT:      Head: Normocephalic and atraumatic  Right Ear: Tympanic membrane, ear canal and external ear normal       Left Ear: Tympanic membrane, ear canal and external ear normal       Nose: Nose normal       Mouth/Throat:      Mouth: Mucous membranes are moist       Pharynx: Oropharynx is clear  Eyes:      General: No scleral icterus       Conjunctiva/sclera: Conjunctivae normal       Pupils: Pupils are equal, round, and reactive to light  Neck:      Thyroid: No thyromegaly  Cardiovascular:      Rate and Rhythm: Normal rate and regular rhythm  Heart sounds: No murmur heard  Pulmonary:      Effort: Pulmonary effort is normal  No respiratory distress  Breath sounds: Normal breath sounds  Abdominal:      General: Bowel sounds are normal  There is no distension  Palpations: Abdomen is soft  Tenderness: There is no abdominal tenderness  There is no guarding or rebound  Musculoskeletal:         General: No deformity  Cervical back: Normal range of motion and neck supple  Lymphadenopathy:      Cervical: No cervical adenopathy  Skin:     General: Skin is warm and dry  Coloration: Skin is not pale  Neurological:      General: No focal deficit present  Mental Status: She is alert and oriented to person, place, and time  Cranial Nerves: No cranial nerve deficit     Psychiatric:         Mood and Affect: Mood normal          Behavior: Behavior normal            Labs:  Lab Results   Component Value Date    WBC 5 72 07/31/2020    HGB 14 7 07/31/2020    HCT 44 9 07/31/2020    MCV 90 07/31/2020     07/31/2020     Lab Results   Component Value Date    K 4 8 07/31/2020     07/31/2020    CO2 25 07/31/2020    BUN 9 07/31/2020    CREATININE 0 84 07/31/2020    GLUF 122 (H) 07/31/2020    CALCIUM 9 2 07/31/2020    AST 14 07/31/2020    ALT 21 07/31/2020    ALKPHOS 54 07/31/2020    EGFR 98 07/31/2020

## 2021-09-24 ENCOUNTER — APPOINTMENT (OUTPATIENT)
Dept: LAB | Facility: CLINIC | Age: 26
End: 2021-09-24
Payer: COMMERCIAL

## 2021-09-24 DIAGNOSIS — R63.4 WEIGHT LOSS, UNINTENTIONAL: ICD-10-CM

## 2021-09-24 DIAGNOSIS — R00.2 HEART PALPITATIONS: ICD-10-CM

## 2021-09-24 LAB
ALBUMIN SERPL BCP-MCNC: 4 G/DL (ref 3.5–5)
ALP SERPL-CCNC: 40 U/L (ref 46–116)
ALT SERPL W P-5'-P-CCNC: 25 U/L (ref 12–78)
ANION GAP SERPL CALCULATED.3IONS-SCNC: 3 MMOL/L (ref 4–13)
AST SERPL W P-5'-P-CCNC: 13 U/L (ref 5–45)
BACTERIA UR QL AUTO: ABNORMAL /HPF
BASOPHILS # BLD AUTO: 0.06 THOUSANDS/ΜL (ref 0–0.1)
BASOPHILS NFR BLD AUTO: 1 % (ref 0–1)
BILIRUB SERPL-MCNC: 0.48 MG/DL (ref 0.2–1)
BILIRUB UR QL STRIP: NEGATIVE
BUN SERPL-MCNC: 11 MG/DL (ref 5–25)
CALCIUM SERPL-MCNC: 9 MG/DL (ref 8.3–10.1)
CHLORIDE SERPL-SCNC: 107 MMOL/L (ref 100–108)
CHOLEST SERPL-MCNC: 104 MG/DL (ref 50–200)
CLARITY UR: CLEAR
CO2 SERPL-SCNC: 27 MMOL/L (ref 21–32)
COLOR UR: YELLOW
CREAT SERPL-MCNC: 0.71 MG/DL (ref 0.6–1.3)
EOSINOPHIL # BLD AUTO: 0.33 THOUSAND/ΜL (ref 0–0.61)
EOSINOPHIL NFR BLD AUTO: 5 % (ref 0–6)
ERYTHROCYTE [DISTWIDTH] IN BLOOD BY AUTOMATED COUNT: 12.2 % (ref 11.6–15.1)
EST. AVERAGE GLUCOSE BLD GHB EST-MCNC: 91 MG/DL
FERRITIN SERPL-MCNC: 19 NG/ML (ref 8–388)
GFR SERPL CREATININE-BSD FRML MDRD: 119 ML/MIN/1.73SQ M
GLUCOSE P FAST SERPL-MCNC: 89 MG/DL (ref 65–99)
GLUCOSE UR STRIP-MCNC: NEGATIVE MG/DL
HBA1C MFR BLD: 4.8 %
HCT VFR BLD AUTO: 40.6 % (ref 34.8–46.1)
HDLC SERPL-MCNC: 44 MG/DL
HGB BLD-MCNC: 13.4 G/DL (ref 11.5–15.4)
HGB UR QL STRIP.AUTO: NEGATIVE
HYALINE CASTS #/AREA URNS LPF: ABNORMAL /LPF
IMM GRANULOCYTES # BLD AUTO: 0.02 THOUSAND/UL (ref 0–0.2)
IMM GRANULOCYTES NFR BLD AUTO: 0 % (ref 0–2)
IRON SATN MFR SERPL: 22 % (ref 15–50)
IRON SERPL-MCNC: 68 UG/DL (ref 50–170)
KETONES UR STRIP-MCNC: NEGATIVE MG/DL
LDLC SERPL CALC-MCNC: 52 MG/DL (ref 0–100)
LEUKOCYTE ESTERASE UR QL STRIP: NEGATIVE
LYMPHOCYTES # BLD AUTO: 2.19 THOUSANDS/ΜL (ref 0.6–4.47)
LYMPHOCYTES NFR BLD AUTO: 35 % (ref 14–44)
MCH RBC QN AUTO: 30 PG (ref 26.8–34.3)
MCHC RBC AUTO-ENTMCNC: 33 G/DL (ref 31.4–37.4)
MCV RBC AUTO: 91 FL (ref 82–98)
MONOCYTES # BLD AUTO: 0.42 THOUSAND/ΜL (ref 0.17–1.22)
MONOCYTES NFR BLD AUTO: 7 % (ref 4–12)
NEUTROPHILS # BLD AUTO: 3.22 THOUSANDS/ΜL (ref 1.85–7.62)
NEUTS SEG NFR BLD AUTO: 52 % (ref 43–75)
NITRITE UR QL STRIP: NEGATIVE
NON-SQ EPI CELLS URNS QL MICRO: ABNORMAL /HPF
NONHDLC SERPL-MCNC: 60 MG/DL
NRBC BLD AUTO-RTO: 0 /100 WBCS
PH UR STRIP.AUTO: 6.5 [PH]
PLATELET # BLD AUTO: 328 THOUSANDS/UL (ref 149–390)
PMV BLD AUTO: 10.1 FL (ref 8.9–12.7)
POTASSIUM SERPL-SCNC: 4.4 MMOL/L (ref 3.5–5.3)
PROT SERPL-MCNC: 7.3 G/DL (ref 6.4–8.2)
PROT UR STRIP-MCNC: NEGATIVE MG/DL
RBC # BLD AUTO: 4.47 MILLION/UL (ref 3.81–5.12)
RBC #/AREA URNS AUTO: ABNORMAL /HPF
SODIUM SERPL-SCNC: 137 MMOL/L (ref 136–145)
SP GR UR STRIP.AUTO: 1.01 (ref 1–1.03)
TIBC SERPL-MCNC: 314 UG/DL (ref 250–450)
TRIGL SERPL-MCNC: 41 MG/DL
TSH SERPL DL<=0.05 MIU/L-ACNC: 1.59 UIU/ML (ref 0.36–3.74)
UROBILINOGEN UR QL STRIP.AUTO: 0.2 E.U./DL
WBC # BLD AUTO: 6.24 THOUSAND/UL (ref 4.31–10.16)
WBC #/AREA URNS AUTO: ABNORMAL /HPF

## 2021-09-24 PROCEDURE — 80061 LIPID PANEL: CPT

## 2021-09-24 PROCEDURE — 36415 COLL VENOUS BLD VENIPUNCTURE: CPT

## 2021-09-24 PROCEDURE — 83550 IRON BINDING TEST: CPT

## 2021-09-24 PROCEDURE — 83036 HEMOGLOBIN GLYCOSYLATED A1C: CPT

## 2021-09-24 PROCEDURE — 80053 COMPREHEN METABOLIC PANEL: CPT

## 2021-09-24 PROCEDURE — 84443 ASSAY THYROID STIM HORMONE: CPT

## 2021-09-24 PROCEDURE — 85025 COMPLETE CBC W/AUTO DIFF WBC: CPT

## 2021-09-24 PROCEDURE — 81001 URINALYSIS AUTO W/SCOPE: CPT

## 2021-09-24 PROCEDURE — 83540 ASSAY OF IRON: CPT

## 2021-09-24 PROCEDURE — 82728 ASSAY OF FERRITIN: CPT

## 2021-10-01 ENCOUNTER — HOSPITAL ENCOUNTER (OUTPATIENT)
Dept: ULTRASOUND IMAGING | Facility: HOSPITAL | Age: 26
Discharge: HOME | End: 2021-10-01
Payer: COMMERCIAL

## 2021-10-01 DIAGNOSIS — R68.81 EARLY SATIETY: ICD-10-CM

## 2021-10-01 DIAGNOSIS — R10.11 RUQ PAIN: ICD-10-CM

## 2021-10-01 DIAGNOSIS — R63.4 WEIGHT LOSS, UNINTENTIONAL: ICD-10-CM

## 2021-10-01 PROCEDURE — 76700 US EXAM ABDOM COMPLETE: CPT

## 2021-10-21 ENCOUNTER — OFFICE VISIT (OUTPATIENT)
Dept: FAMILY MEDICINE CLINIC | Facility: CLINIC | Age: 26
End: 2021-10-21
Payer: COMMERCIAL

## 2021-10-21 VITALS
OXYGEN SATURATION: 99 % | HEART RATE: 91 BPM | TEMPERATURE: 97.1 F | WEIGHT: 108.6 LBS | DIASTOLIC BLOOD PRESSURE: 68 MMHG | BODY MASS INDEX: 19.98 KG/M2 | SYSTOLIC BLOOD PRESSURE: 110 MMHG | HEIGHT: 62 IN

## 2021-10-21 DIAGNOSIS — K76.9 LIVER LESION: ICD-10-CM

## 2021-10-21 DIAGNOSIS — R63.4 WEIGHT LOSS, UNINTENTIONAL: ICD-10-CM

## 2021-10-21 DIAGNOSIS — F41.9 ANXIETY: ICD-10-CM

## 2021-10-21 DIAGNOSIS — F43.0 STRESS REACTION: Primary | ICD-10-CM

## 2021-10-21 PROCEDURE — 99214 OFFICE O/P EST MOD 30 MIN: CPT | Performed by: PHYSICIAN ASSISTANT

## 2021-10-21 PROCEDURE — 3008F BODY MASS INDEX DOCD: CPT | Performed by: PHYSICIAN ASSISTANT

## 2021-10-21 PROCEDURE — 1036F TOBACCO NON-USER: CPT | Performed by: PHYSICIAN ASSISTANT

## 2021-10-21 RX ORDER — SERTRALINE HYDROCHLORIDE 25 MG/1
25 TABLET, FILM COATED ORAL DAILY
Qty: 30 TABLET | Refills: 1 | Status: SHIPPED | OUTPATIENT
Start: 2021-10-21 | End: 2021-12-22

## 2021-11-23 ENCOUNTER — HOSPITAL ENCOUNTER (OUTPATIENT)
Dept: MRI IMAGING | Facility: HOSPITAL | Age: 26
Discharge: HOME | End: 2021-11-23
Payer: COMMERCIAL

## 2021-11-23 DIAGNOSIS — K76.9 LIVER LESION: ICD-10-CM

## 2021-11-23 DIAGNOSIS — R93.2 ABNORMAL ULTRASOUND OF LIVER: ICD-10-CM

## 2021-11-23 PROCEDURE — G1004 CDSM NDSC: HCPCS

## 2021-11-23 PROCEDURE — A9581 GADOXETATE DISODIUM INJ: HCPCS | Performed by: PHYSICIAN ASSISTANT

## 2021-11-23 PROCEDURE — 74183 MRI ABD W/O CNTR FLWD CNTR: CPT

## 2021-11-23 RX ADMIN — GADOXETATE DISODIUM 8 ML: 181.43 INJECTION, SOLUTION INTRAVENOUS at 15:13

## 2021-11-30 ENCOUNTER — TELEPHONE (OUTPATIENT)
Dept: FAMILY MEDICINE CLINIC | Facility: CLINIC | Age: 26
End: 2021-11-30

## 2021-11-30 DIAGNOSIS — R93.5 ABNORMAL MRI OF ABDOMEN: ICD-10-CM

## 2021-11-30 DIAGNOSIS — K76.9 LIVER LESION: Primary | ICD-10-CM

## 2022-01-18 DIAGNOSIS — F41.9 ANXIETY: ICD-10-CM

## 2022-01-18 DIAGNOSIS — F43.0 STRESS REACTION: ICD-10-CM

## 2022-01-19 RX ORDER — SERTRALINE HYDROCHLORIDE 25 MG/1
TABLET, FILM COATED ORAL
Qty: 30 TABLET | Refills: 1 | Status: SHIPPED | OUTPATIENT
Start: 2022-01-19 | End: 2022-04-25

## 2022-04-21 ENCOUNTER — VBI (OUTPATIENT)
Dept: ADMINISTRATIVE | Facility: OTHER | Age: 27
End: 2022-04-21

## 2022-07-09 DIAGNOSIS — F43.0 STRESS REACTION: ICD-10-CM

## 2022-07-09 DIAGNOSIS — F41.9 ANXIETY: ICD-10-CM

## 2022-07-11 RX ORDER — SERTRALINE HYDROCHLORIDE 25 MG/1
TABLET, FILM COATED ORAL
Qty: 90 TABLET | Refills: 0 | Status: SHIPPED | OUTPATIENT
Start: 2022-07-11 | End: 2022-07-25 | Stop reason: SDUPTHER

## 2022-07-25 ENCOUNTER — OFFICE VISIT (OUTPATIENT)
Dept: FAMILY MEDICINE CLINIC | Facility: CLINIC | Age: 27
End: 2022-07-25
Payer: COMMERCIAL

## 2022-07-25 VITALS
DIASTOLIC BLOOD PRESSURE: 84 MMHG | WEIGHT: 106 LBS | HEART RATE: 79 BPM | BODY MASS INDEX: 19.51 KG/M2 | OXYGEN SATURATION: 99 % | TEMPERATURE: 98.2 F | SYSTOLIC BLOOD PRESSURE: 122 MMHG | HEIGHT: 62 IN

## 2022-07-25 DIAGNOSIS — F32.0 CURRENT MILD EPISODE OF MAJOR DEPRESSIVE DISORDER, UNSPECIFIED WHETHER RECURRENT (HCC): Primary | ICD-10-CM

## 2022-07-25 DIAGNOSIS — F41.9 ANXIETY: ICD-10-CM

## 2022-07-25 DIAGNOSIS — Z31.69 ENCOUNTER FOR PRECONCEPTION CONSULTATION: ICD-10-CM

## 2022-07-25 DIAGNOSIS — K76.9 LIVER LESION: ICD-10-CM

## 2022-07-25 DIAGNOSIS — Z12.4 SCREENING FOR CERVICAL CANCER: ICD-10-CM

## 2022-07-25 PROCEDURE — 99214 OFFICE O/P EST MOD 30 MIN: CPT | Performed by: PHYSICIAN ASSISTANT

## 2022-07-25 NOTE — PROGRESS NOTES
Routine Follow-up    Montrell Ferrara 33 y o  female   Date:  7/25/2022      Assessment and Plan:    Cammie Rice was seen today for follow-up and medication management  Diagnoses and all orders for this visit:    Current mild episode of major depressive disorder, unspecified whether recurrent ([facility] [address] )  Comments:  will increase zoloft to 50 mg and revisit in 1 month; would recommend increasing dose before deeming a failure; can consider celexa if needed     Anxiety  -     sertraline (ZOLOFT) 50 mg tablet; Take 1 tablet (50 mg total) by mouth daily    Screening for cervical cancer  -     Ambulatory referral to Obstetrics / Gynecology; Future    Liver lesion  Comments:  overdue for 4-6 months repeat MRI to ensure stability, MRI order given to patient again    Encounter for preconception consultation  Comments:  zoloft and celexa are considered safe for pregnancy, risks/benefits discussed; encouraged follow up with OBGYN for preconception counseling, continue prenatals        Depression Screening and Follow-up Plan: Patient's depression screening was positive with a PHQ-2 score of 6  Their PHQ-9 score was 15  Patient assessed for underlying major depression  Brief counseling provided and recommend additional follow-up/re-evaluation next office visit  HPI:  Chief Complaint   Patient presents with    Follow-up    Medication Management     Does feel Sertraline is effective anymore  HPI   Patient is a 32 yo female who presents for overdue follow up  She was seen last in Oct and started on zoloft for anxiety and stress reaction  She noticed improvement when first started the zoloft and did help to improve her appetite  However, she has lacked follow up since  She no longer feels it is effective but now struggling with depressive symptoms  She explains she will get upset easily  She lacks motivation and loses interest  She explains that everything is going well and she does not understands why she is feeling down  She stopped smoking marijuana, felt that was making her mood worse, has helped a little  Her job and relationship is going well  She and sig other are starting to try to conceive so she started prenatals  She is overdue for OBGYN follow up  ROS: Review of Systems   Constitutional: Negative  Appetite change: stable, improved on zoloft  Respiratory: Negative  Cardiovascular: Negative  Neurological: Negative  Psychiatric/Behavioral: Positive for dysphoric mood  Negative for suicidal ideas  The patient is nervous/anxious          Past Medical History:   Diagnosis Date    Asthma     Dysmenorrhea      Patient Active Problem List   Diagnosis    Chronic bilateral low back pain with bilateral sciatica    Intermittent left lower quadrant abdominal pain    Bilateral sciatica    Asthma    Dysmenorrhea    Pelvic pain in female    Liver lesion       Past Surgical History:   Procedure Laterality Date    NO PAST SURGERIES         Social History     Socioeconomic History    Marital status: Single     Spouse name: None    Number of children: None    Years of education: None    Highest education level: None   Occupational History    None   Tobacco Use    Smoking status: Never Smoker    Smokeless tobacco: Never Used   Vaping Use    Vaping Use: Never used   Substance and Sexual Activity    Alcohol use: Yes     Comment: Social    Drug use: Not Currently     Types: Marijuana    Sexual activity: Yes     Partners: Male     Birth control/protection: None   Other Topics Concern    None   Social History Narrative    Uses seatbelts     Social Determinants of Health     Financial Resource Strain: Not on file   Food Insecurity: Not on file   Transportation Needs: Not on file   Physical Activity: Not on file   Stress: Not on file   Social Connections: Not on file   Intimate Partner Violence: Not on file   Housing Stability: Not on file       Family History   Problem Relation Age of Onset    Hypertension Mother  Asthma Father     Heart disease Maternal Grandmother     Hypertension Maternal Grandfather     Heart disease Paternal Grandmother     Diabetes Family     Cancer Family     Breast cancer Neg Hx     Colon cancer Neg Hx     Ovarian cancer Neg Hx     Uterine cancer Neg Hx        Allergies   Allergen Reactions    Cat Hair Extract     Dust Mite Extract     Other Allergic Rhinitis         Current Outpatient Medications:     Prenatal Vit-Iron Carbonyl-FA (prenatal multivitamin) TABS, Take 1 tablet by mouth daily, Disp: , Rfl:     sertraline (ZOLOFT) 50 mg tablet, Take 1 tablet (50 mg total) by mouth daily, Disp: , Rfl:     PHQ-2/9 Depression Screening    Little interest or pleasure in doing things: 3 - nearly every day  Feeling down, depressed, or hopeless: 3 - nearly every day  Trouble falling or staying asleep, or sleeping too much: 3 - nearly every day  Feeling tired or having little energy: 3 - nearly every day  Poor appetite or overeatin - not at all  Feeling bad about yourself - or that you are a failure or have let yourself or your family down: 1 - several days  Trouble concentrating on things, such as reading the newspaper or watching television: 2 - more than half the days  Moving or speaking so slowly that other people could have noticed  Or the opposite - being so fidgety or restless that you have been moving around a lot more than usual: 0 - not at all  Thoughts that you would be better off dead, or of hurting yourself in some way: 0 - not at all  PHQ-2 Score: 6  PHQ-2 Interpretation: POSITIVE depression screen  PHQ-9 Score: 15   PHQ-9 Interpretation: Moderately severe depression          Physical Exam:  /84 (BP Location: Left arm, Patient Position: Sitting)   Pulse 79   Temp 98 2 °F (36 8 °C)   Ht 5' 2" (1 575 m)   Wt 48 1 kg (106 lb)   SpO2 99%   BMI 19 39 kg/m²     Physical Exam  Constitutional:       General: She is not in acute distress  Appearance: Normal appearance  HENT:      Head: Normocephalic  Right Ear: External ear normal       Left Ear: External ear normal    Eyes:      Conjunctiva/sclera: Conjunctivae normal    Cardiovascular:      Rate and Rhythm: Normal rate  Pulmonary:      Effort: Pulmonary effort is normal  No respiratory distress  Skin:     General: Skin is dry  Neurological:      General: No focal deficit present  Mental Status: She is alert and oriented to person, place, and time  Psychiatric:         Mood and Affect: Mood normal          Behavior: Behavior normal          Thought Content:  Thought content normal            Labs:  Lab Results   Component Value Date    WBC 6 24 09/24/2021    HGB 13 4 09/24/2021    HCT 40 6 09/24/2021    MCV 91 09/24/2021     09/24/2021     Lab Results   Component Value Date    K 4 4 09/24/2021     09/24/2021    CO2 27 09/24/2021    BUN 11 09/24/2021    CREATININE 0 71 09/24/2021    GLUF 89 09/24/2021    CALCIUM 9 0 09/24/2021    AST 13 09/24/2021    ALT 25 09/24/2021    ALKPHOS 40 (L) 09/24/2021    EGFR 119 09/24/2021

## 2022-07-27 ENCOUNTER — HOSPITAL ENCOUNTER (EMERGENCY)
Facility: HOSPITAL | Age: 27
Discharge: HOME | End: 2022-07-27
Attending: EMERGENCY MEDICINE
Payer: COMMERCIAL

## 2022-07-27 ENCOUNTER — TELEPHONE (OUTPATIENT)
Dept: OBGYN CLINIC | Facility: CLINIC | Age: 27
End: 2022-07-27

## 2022-07-27 VITALS
OXYGEN SATURATION: 100 % | BODY MASS INDEX: 19.69 KG/M2 | HEIGHT: 62 IN | HEART RATE: 89 BPM | SYSTOLIC BLOOD PRESSURE: 130 MMHG | DIASTOLIC BLOOD PRESSURE: 80 MMHG | WEIGHT: 107 LBS | RESPIRATION RATE: 18 BRPM | TEMPERATURE: 98.1 F

## 2022-07-27 DIAGNOSIS — B37.9 YEAST INFECTION: ICD-10-CM

## 2022-07-27 DIAGNOSIS — N76.0 VAGINITIS: Primary | ICD-10-CM

## 2022-07-27 PROCEDURE — 81514 NFCT DS BV&VAGINITIS DNA ALG: CPT | Performed by: EMERGENCY MEDICINE

## 2022-07-27 PROCEDURE — 96372 THER/PROPH/DIAG INJ SC/IM: CPT

## 2022-07-27 PROCEDURE — 99284 EMERGENCY DEPT VISIT MOD MDM: CPT

## 2022-07-27 PROCEDURE — 99284 EMERGENCY DEPT VISIT MOD MDM: CPT | Performed by: EMERGENCY MEDICINE

## 2022-07-27 RX ORDER — DOXYCYCLINE HYCLATE 100 MG/1
100 CAPSULE ORAL 2 TIMES DAILY
Qty: 14 CAPSULE | Refills: 0 | Status: SHIPPED | OUTPATIENT
Start: 2022-07-27 | End: 2022-08-03

## 2022-07-27 RX ORDER — METRONIDAZOLE 500 MG/1
500 TABLET ORAL ONCE
Status: COMPLETED | OUTPATIENT
Start: 2022-07-27 | End: 2022-07-27

## 2022-07-27 RX ORDER — DOXYCYCLINE HYCLATE 100 MG/1
100 CAPSULE ORAL ONCE
Status: COMPLETED | OUTPATIENT
Start: 2022-07-27 | End: 2022-07-27

## 2022-07-27 RX ORDER — CEFTRIAXONE 500 MG/1
INJECTION, POWDER, FOR SOLUTION INTRAMUSCULAR; INTRAVENOUS
Status: DISCONTINUED
Start: 2022-07-27 | End: 2022-07-27 | Stop reason: HOSPADM

## 2022-07-27 RX ORDER — METRONIDAZOLE 500 MG/1
500 TABLET ORAL EVERY 12 HOURS SCHEDULED
Qty: 14 TABLET | Refills: 0 | Status: SHIPPED | OUTPATIENT
Start: 2022-07-27 | End: 2022-08-03

## 2022-07-27 RX ADMIN — DOXYCYCLINE 100 MG: 100 CAPSULE ORAL at 05:58

## 2022-07-27 RX ADMIN — LIDOCAINE HYDROCHLORIDE 250 MG: 10 INJECTION, SOLUTION EPIDURAL; INFILTRATION; INTRACAUDAL; PERINEURAL at 06:19

## 2022-07-27 RX ADMIN — METRONIDAZOLE 500 MG: 500 TABLET ORAL at 05:58

## 2022-07-27 NOTE — ED PROVIDER NOTES
History  Chief Complaint   Patient presents with    Foreign Body in Vagina     Pt put a tampon in at 6pm and is unable to remove tampon, denies pain       34 yo female    Here for vaginal discomfort  She thinks she has a retained tampon since yesterday evening    She has no abdominal pain  She denies nausea vomiting   Denies fevers or chills  Denies back pain      History provided by:  Patient  Medical Problem  Location:  Concern for possible retained vaginal tampon  Associated symptoms: no abdominal pain, no cough, no fatigue, no fever, no headaches, no myalgias, no nausea, no rash, no shortness of breath, no vomiting and no wheezing        Prior to Admission Medications   Prescriptions Last Dose Informant Patient Reported? Taking? Prenatal Vit-Iron Carbonyl-FA (prenatal multivitamin) TABS   Yes No   Sig: Take 1 tablet by mouth daily   sertraline (ZOLOFT) 50 mg tablet   No No   Sig: Take 1 tablet (50 mg total) by mouth daily      Facility-Administered Medications: None       Past Medical History:   Diagnosis Date    Asthma     Dysmenorrhea        Past Surgical History:   Procedure Laterality Date    NO PAST SURGERIES         Family History   Problem Relation Age of Onset    Hypertension Mother     Asthma Father     Heart disease Maternal Grandmother     Hypertension Maternal Grandfather     Heart disease Paternal Grandmother     Diabetes Family     Cancer Family     Breast cancer Neg Hx     Colon cancer Neg Hx     Ovarian cancer Neg Hx     Uterine cancer Neg Hx      I have reviewed and agree with the history as documented      E-Cigarette/Vaping    E-Cigarette Use Never User      E-Cigarette/Vaping Substances    Nicotine No     THC No     CBD No     Flavoring No     Other No     Unknown No      Social History     Tobacco Use    Smoking status: Never Smoker    Smokeless tobacco: Never Used   Vaping Use    Vaping Use: Never used   Substance Use Topics    Alcohol use: Yes     Comment: Social    Drug use: Not Currently     Types: Marijuana       Review of Systems   Constitutional: Negative for chills, diaphoresis, fatigue and fever  Respiratory: Negative for cough, shortness of breath, wheezing and stridor  Cardiovascular: Negative for palpitations  Gastrointestinal: Negative for abdominal pain, blood in stool, nausea and vomiting  Genitourinary: Negative for difficulty urinating, dysuria, flank pain and frequency  Musculoskeletal: Negative for arthralgias, back pain, gait problem, joint swelling, myalgias, neck pain and neck stiffness  Skin: Negative for rash and wound  Neurological: Negative for dizziness, light-headedness and headaches  All other systems reviewed and are negative  Physical Exam  Physical Exam  Constitutional:       General: She is not in acute distress  Appearance: She is well-developed  She is not ill-appearing, toxic-appearing or diaphoretic  HENT:      Head: Normocephalic and atraumatic  Nose: Nose normal    Eyes:      General: No scleral icterus  Right eye: No discharge  Left eye: No discharge  Extraocular Movements: Extraocular movements intact  Conjunctiva/sclera: Conjunctivae normal       Pupils: Pupils are equal, round, and reactive to light  Neck:      Vascular: No JVD  Trachea: No tracheal deviation  Cardiovascular:      Rate and Rhythm: Normal rate and regular rhythm  Pulses: Normal pulses  Heart sounds: Normal heart sounds  No murmur heard  No friction rub  No gallop  Pulmonary:      Effort: Pulmonary effort is normal  No respiratory distress  Breath sounds: Normal breath sounds  No stridor  No wheezing, rhonchi or rales  Chest:      Chest wall: No tenderness  Abdominal:      General: Bowel sounds are normal  There is no distension  Palpations: Abdomen is soft  There is no mass  Tenderness: There is no abdominal tenderness   There is no right CVA tenderness, left CVA tenderness, guarding or rebound  Hernia: No hernia is present  Genitourinary:     General: Normal vulva  Vagina: Vaginal discharge present  Comments: Vaginal exam done with chaperone at bedside  Jacob green discharge consistent with BV  No visualized foreign body or tampon present  No cervical motion tenderness  No adnexal tenderness  Cervix is closed  No bleeding  Musculoskeletal:         General: No swelling, tenderness, deformity or signs of injury  Normal range of motion  Cervical back: Normal range of motion and neck supple  No rigidity or tenderness  Right lower leg: No edema  Left lower leg: No edema  Lymphadenopathy:      Cervical: No cervical adenopathy  Skin:     General: Skin is warm  Capillary Refill: Capillary refill takes less than 2 seconds  Coloration: Skin is not jaundiced or pale  Findings: No bruising, erythema, lesion or rash  Neurological:      General: No focal deficit present  Mental Status: She is alert and oriented to person, place, and time  Cranial Nerves: No cranial nerve deficit  Sensory: No sensory deficit  Motor: No weakness or abnormal muscle tone  Coordination: Coordination normal    Psychiatric:         Mood and Affect: Mood normal          Behavior: Behavior normal          Thought Content:  Thought content normal          Judgment: Judgment normal          Vital Signs  ED Triage Vitals [07/27/22 0224]   Temperature Pulse Respirations Blood Pressure SpO2   98 1 °F (36 7 °C) 89 18 130/80 100 %      Temp Source Heart Rate Source Patient Position - Orthostatic VS BP Location FiO2 (%)   Oral Monitor -- -- --      Pain Score       No Pain           Vitals:    07/27/22 0224   BP: 130/80   Pulse: 89         Visual Acuity      ED Medications  Medications   cefTRIAXone (ROCEPHIN) 250 mg in lidocaine (PF) (XYLOCAINE-MPF) 1 % IM only syringe (has no administration in time range)   metroNIDAZOLE (FLAGYL) tablet 500 mg (has no administration in time range)   doxycycline hyclate (VIBRAMYCIN) capsule 100 mg (has no administration in time range)       Diagnostic Studies  Results Reviewed     Procedure Component Value Units Date/Time    hCG, qualitative pregnancy [[id number]]     Lab Status: No result Specimen: Blood     Lipase [[id number]]     Lab Status: No result Specimen: Blood     CBC and differential [[id number]]     Lab Status: No result Specimen: Blood     CMP [[id number]]     Lab Status: No result Specimen: Blood                  CT abdomen pelvis wo contrast    (Results Pending)              Procedures  Procedures         ED Course  ED Course as of 07/27/22 0752   Wed Jul 27, 2022   0524 Pt seen and evaluated  Pelvic exam done by myself with chaperone, tech at bedside    She has moderate green and gray discharge  I cannot see or palpate a retained foreign body, tampon, as the patient was concerned about  She insists that there is a retained foreign body     0536 I did a 2nd pelvic exam, patient was concerned that maybe I was missing a risk tainted tampon  Second exam was done again with chaperone and also to obtain cultures  I see no foreign body at all  Patient does not have much discomfort, but the discomfort she is having is likely just from infection  I discussed this with the patient  I also offered CT scan and labs, if she was concerned  And she felt fine with my evaluation today and the diagnosis of vaginitis  She will return if worse  She will follow-up with her gynecologist   We will treat her for vaginitis at this time  She understands return precautions for pain or flu-like symptoms                                             MDM    Disposition  Final diagnoses:   None     ED Disposition     None      Follow-up Information    None         Patient's Medications   Discharge Prescriptions    No medications on file       No discharge procedures on file      PDMP Review     None          ED Provider  Electronically Signed by           Radha Vigil MD  07/27/22 3401

## 2022-07-27 NOTE — DISCHARGE INSTRUCTIONS
RETURN IF WORSE IN ANY WAY:   WORSENING SYMPTOMS IN ANY WAY   PELVIC PAIN  FEVER OR FLU LIKE SYMPTOMS,   OR NEW AND CONCERNING SYMPTOMS SIGNS OR SYMPTOMS      PLEASE CALL YOUR PRIMARY DOCTOR IN THE MORNING TO SET UP FOLLOW UP   PLEASE REVIEW THE WORK UP RESULTS WITH YOUR DOCTOR

## 2022-07-27 NOTE — TELEPHONE ENCOUNTER
Patient was seen in the ER for a foreign body in her vagina this morning  She called and said she still uncomfortable  I called patient and told her that they ran tests for infection, and to wait for the test results

## 2022-07-28 LAB
C GLABRATA DNA VAG QL NAA+PROBE: NEGATIVE
C KRUSEI DNA VAG QL NAA+PROBE: NEGATIVE
CANDIDA SP 6 PNL VAG NAA+PROBE: POSITIVE
T VAGINALIS DNA VAG QL NAA+PROBE: NEGATIVE
VAGINOSIS/ITIS DNA PNL VAG PROBE+SIG AMP: NEGATIVE

## 2022-07-28 NOTE — TELEPHONE ENCOUNTER
According to ED note she was prescribed antibiotics  Please advise pt to complete course of medication and to f/u with us if her symptoms do not resolve after treatment

## 2022-07-29 RX ORDER — FLUCONAZOLE 150 MG/1
150 TABLET ORAL DAILY
Qty: 1 TABLET | Refills: 0 | Status: SHIPPED | OUTPATIENT
Start: 2022-07-29 | End: 2022-07-30

## 2022-10-06 DIAGNOSIS — F41.9 ANXIETY: ICD-10-CM

## 2022-11-03 DIAGNOSIS — F41.9 ANXIETY: ICD-10-CM

## 2022-11-28 ENCOUNTER — VBI (OUTPATIENT)
Dept: ADMINISTRATIVE | Facility: OTHER | Age: 27
End: 2022-11-28

## 2022-12-09 DIAGNOSIS — F41.9 ANXIETY: ICD-10-CM

## 2023-08-28 ENCOUNTER — OFFICE VISIT (OUTPATIENT)
Dept: FAMILY MEDICINE CLINIC | Facility: CLINIC | Age: 28
End: 2023-08-28
Payer: COMMERCIAL

## 2023-08-28 VITALS
WEIGHT: 114 LBS | BODY MASS INDEX: 20.98 KG/M2 | HEIGHT: 62 IN | SYSTOLIC BLOOD PRESSURE: 100 MMHG | TEMPERATURE: 97.5 F | DIASTOLIC BLOOD PRESSURE: 70 MMHG | HEART RATE: 76 BPM | OXYGEN SATURATION: 99 %

## 2023-08-28 DIAGNOSIS — R39.9 URINARY SYMPTOM OR SIGN: ICD-10-CM

## 2023-08-28 DIAGNOSIS — Z12.4 CERVICAL CANCER SCREENING: ICD-10-CM

## 2023-08-28 DIAGNOSIS — R35.0 FREQUENCY OF URINATION: Primary | ICD-10-CM

## 2023-08-28 LAB
SL AMB  POCT GLUCOSE, UA: ABNORMAL
SL AMB LEUKOCYTE ESTERASE,UA: ABNORMAL
SL AMB POCT BILIRUBIN,UA: ABNORMAL
SL AMB POCT BLOOD,UA: ABNORMAL
SL AMB POCT CLARITY,UA: CLEAR
SL AMB POCT COLOR,UA: YELLOW
SL AMB POCT KETONES,UA: ABNORMAL
SL AMB POCT NITRITE,UA: ABNORMAL
SL AMB POCT PH,UA: 5
SL AMB POCT SPECIFIC GRAVITY,UA: 1.03
SL AMB POCT URINE PROTEIN: ABNORMAL
SL AMB POCT UROBILINOGEN: ABNORMAL

## 2023-08-28 PROCEDURE — 81002 URINALYSIS NONAUTO W/O SCOPE: CPT | Performed by: FAMILY MEDICINE

## 2023-08-28 PROCEDURE — 87086 URINE CULTURE/COLONY COUNT: CPT | Performed by: FAMILY MEDICINE

## 2023-08-28 PROCEDURE — 99214 OFFICE O/P EST MOD 30 MIN: CPT | Performed by: FAMILY MEDICINE

## 2023-08-28 NOTE — PROGRESS NOTES
Name: Guicho Duarte      : 1995      MRN: [id number]  Encounter Provider: Aliyah Chase DO  Encounter Date: 2023   Encounter department: [address] [place] [address]     1. Frequency of urination  -     POCT urine dip  -     Urine culture; Future    2. Urinary symptom or sign  -     Urine culture; Future    3. Cervical cancer screening  Comments:  pt will return for GYN exam/PAP here - to be scheduled           Subjective      Chief Complaint   Patient presents with   • Urinary Tract Infection     No symptoms at this time but has been getting recurring UTS. Here for problem visit - c/o "recurrent UTI's" per pt  Pt not known to me- she had followed with other clinician here who left practice- was last seen 2022  States has been getting recurrent UTI sx - suprapubic pressure, urinary frequency and urgency - over the past month or two has happened 3x - takes Azo "and goes away" each time   Most recent sx were last week  Never has had culture positive UTI per pt  Last GYN visit was for pelvic pain and dysmenorrhea in  - no PAP since   Pt is no longer on OCP- also no longer taking zoloft        2020  [facility]  HPI  NEW PATIENT PROBLEM  CC: chronic pelvic pain  27 yo g0 presents for evaluation of pelvic pain. She reports that pain started approximately one year ago, describes as sharp, intermittent, in lower pelvis. It can be bilateral or unilateral, it lasts a couple seconds, ranges from mild to severe. Happening daily. 5. pelvic ultrasound normal  GYN history unremarkable, negative for STDs, negative for abnormal paps. Menarche 7, started OCP for dysmenorrhea and for birth control. Has been taking OCP for 5 years with good management of her dysmenorrhea. LMP , normal.  She is sexually active, monogamous relationship. She has a history of lower back pain/ sciatica. 2017 pap negative  Assessment/Plan:  1.  Pelvic pain in female  Chronic for one year, on OCP. Normal exam.  Suspect GI etiology or lower back radiating pain. Rule out infection: g/c obtained. Wet mount negative. Check pelvic US. If all negative would suggest GI evaluation.  - US pelvis complete non OB; Future   2.  Dysmenorrhea  Well managed by OCP for the past 5 years. Review of Systems    Current Outpatient Medications on File Prior to Visit   Medication Sig   • [DISCONTINUED] Prenatal Vit-Iron Carbonyl-FA (prenatal multivitamin) TABS Take 1 tablet by mouth daily (Patient not taking: Reported on 8/28/2023)   • [DISCONTINUED] sertraline (ZOLOFT) 50 mg tablet take 1 tablet by mouth once daily (Patient not taking: Reported on 8/28/2023)       Objective     /70 (BP Location: Left arm, Patient Position: Sitting, Cuff Size: Standard)   Pulse 76   Temp 97.5 °F (36.4 °C) (Tympanic)   Ht 5' 2" (1.575 m)   Wt 51.7 kg (114 lb)   SpO2 99%   BMI 20.85 kg/m²     Physical Exam  Vitals and nursing note reviewed. Constitutional:       General: She is not in acute distress. Appearance: She is well-developed. She is not ill-appearing, toxic-appearing or diaphoretic. HENT:      Head: Normocephalic and atraumatic. Mouth/Throat:      Pharynx: Uvula midline. Neck:      Trachea: Phonation normal.   Pulmonary:      Effort: Pulmonary effort is normal.   Abdominal:      General: Bowel sounds are normal.      Palpations: Abdomen is soft. There is no hepatomegaly, splenomegaly or mass. Tenderness: There is no abdominal tenderness. There is no right CVA tenderness or left CVA tenderness. Hernia: There is no hernia in the ventral area. Skin:     General: Skin is warm and dry. Coloration: Skin is not pale. Neurological:      Mental Status: She is alert and oriented to person, place, and time. Psychiatric:         Mood and Affect: Mood normal.         Behavior: Behavior normal. Behavior is cooperative.        Gib Adjutant, DO

## 2023-08-30 LAB — BACTERIA UR CULT: NORMAL

## 2023-10-04 ENCOUNTER — RA CDI HCC (OUTPATIENT)
Dept: OTHER | Facility: HOSPITAL | Age: 28
End: 2023-10-04

## 2023-10-04 NOTE — PROGRESS NOTES
[address] [facility] coding opportunities          Chart Reviewed number of suggestions sent to Provider: 1   [phone]      Patients Insurance        Commercial Insurance: [employer]

## 2023-10-16 ENCOUNTER — OFFICE VISIT (OUTPATIENT)
Dept: FAMILY MEDICINE CLINIC | Facility: CLINIC | Age: 28
End: 2023-10-16

## 2023-10-16 VITALS
OXYGEN SATURATION: 99 % | DIASTOLIC BLOOD PRESSURE: 68 MMHG | WEIGHT: 115 LBS | HEART RATE: 67 BPM | TEMPERATURE: 97.8 F | BODY MASS INDEX: 21.16 KG/M2 | HEIGHT: 62 IN | SYSTOLIC BLOOD PRESSURE: 110 MMHG

## 2023-10-16 DIAGNOSIS — Z01.419 WOMEN'S ANNUAL ROUTINE GYNECOLOGICAL EXAMINATION: Primary | ICD-10-CM

## 2023-10-16 DIAGNOSIS — Z12.4 SCREENING FOR MALIGNANT NEOPLASM OF THE CERVIX: ICD-10-CM

## 2023-10-16 NOTE — PROGRESS NOTES
Assessment     Healthy female exam.    Oriana Vázquez was seen today for gynecologic exam.    Diagnoses and all orders for this visit:    Women's annual routine gynecological examination    Screening for malignant neoplasm of the cervix  -     Liquid-based pap, screening; Future  -     Liquid-based pap, screening  -     Molecular Hold Sample      Chief Complaint   Patient presents with    Gynecologic Exam     Pt refused flu vaccine       Subjective     Vernell Crystal is a 33 y.o. female here for a routine exam.    Current complaints: none. Takes MVI daily   Has had 5 total male partners -currently in relationship with 5th    Gynecologic History    LMP 10/05/2023  Contraception: condoms  Last Pap: 2017. Results were: normal (scanned in lab result tab as "Cytology (HISTORICAL)"    2020  [address], [facility]   HPI  NEW PATIENT PROBLEM  CC: chronic pelvic pain  27 yo g0 presents for evaluation of pelvic pain. She reports that pain started approximately one year ago, describes as sharp, intermittent, in lower pelvis. It can be bilateral or unilateral, it lasts a couple seconds, ranges from mild to severe. Happening daily.  pelvic ultrasound normal  GYN history unremarkable, negative for STDs, negative for abnormal paps. Menarche 7, started OCP for dysmenorrhea and for birth control. Has been taking OCP for 5 years with good management of her dysmenorrhea. LMP , normal.  She is sexually active, monogamous relationship. She has a history of lower back pain/ sciatica.   2017 pap negative    menarche age 7    Obstetric History   OB History    Para Term  AB Living   0 0 0 0 0 0   SAB IAB Ectopic Multiple Live Births   0 0 0 0 0   Obstetric Comments   Menarche 7         The following portions of the patient's history were reviewed and updated as appropriate: allergies, current medications, past family history, past medical history, past social history, past surgical history, and problem list.    Review of Systems  Pertinent items are noted in HPI.       Objective     /68 (BP Location: Left arm, Patient Position: Sitting, Cuff Size: Standard)   Pulse 67   Temp 97.8 °F (36.6 °C) (Tympanic)   Ht 5' 2" (1.575 m)   Wt 52.2 kg (115 lb)   SpO2 99%   BMI 21.03 kg/m²     General Appearance:    Alert, cooperative, no distress, appears stated age   Head:    Normocephalic, without obvious abnormality, atraumatic   Eyes:    PERRL, conjunctiva/corneas clear, EOM's intact, fundi     benign, both eyes       Nose:   Nares normal, septum midline, mucosa normal, no drainage    or sinus tenderness   Throat:   Lips, mucosa, and tongue normal; teeth and gums normal   Neck:   Supple, symmetrical, trachea midline, no adenopathy;     thyroid: no enlargement/tenderness/nodules; no carotid    bruit or JVD       Lungs:     Clear to auscultation bilaterally, respirations unlabored   Chest Wall:    No gross deformity    Heart:    Regular rate and rhythm, S1 and S2 normal, no murmur, rub   or gallop   Breast Exam:    deferred   Abdomen:     Soft, non-tender, bowel sounds active all four quadrants,     no masses, no organomegaly   Genitalia:    Normal female without lesion, discharge or tenderness       Extremities:   Extremities normal, atraumatic, no cyanosis or edema   Pulses:   2+ and symmetric all extremities   Skin:   Skin color, texture, turgor normal, no rashes or lesions   Lymph nodes:   Cervical, supraclavicular, and axillary nodes normal   Neurologic:  grossly normal strength throughout

## 2023-10-23 LAB
HPV HR 12 DNA CVX QL NAA+PROBE: POSITIVE
HPV16 DNA CVX QL NAA+PROBE: NEGATIVE
HPV18 DNA CVX QL NAA+PROBE: NEGATIVE
LAB AP GYN PRIMARY INTERPRETATION: ABNORMAL
Lab: ABNORMAL
PATH INTERP SPEC-IMP: ABNORMAL

## 2023-12-15 ENCOUNTER — APPOINTMENT (OUTPATIENT)
Dept: LAB | Facility: CLINIC | Age: 28
End: 2023-12-15
Payer: COMMERCIAL

## 2023-12-15 ENCOUNTER — OFFICE VISIT (OUTPATIENT)
Dept: FAMILY MEDICINE CLINIC | Facility: CLINIC | Age: 28
End: 2023-12-15
Payer: COMMERCIAL

## 2023-12-15 VITALS
DIASTOLIC BLOOD PRESSURE: 88 MMHG | HEIGHT: 62 IN | WEIGHT: 115.8 LBS | OXYGEN SATURATION: 100 % | BODY MASS INDEX: 21.31 KG/M2 | TEMPERATURE: 97.4 F | SYSTOLIC BLOOD PRESSURE: 130 MMHG | HEART RATE: 99 BPM

## 2023-12-15 DIAGNOSIS — R03.0 BLOOD PRESSURE ELEVATED WITHOUT HISTORY OF HTN: ICD-10-CM

## 2023-12-15 DIAGNOSIS — N91.2 AMENORRHEA: ICD-10-CM

## 2023-12-15 DIAGNOSIS — R87.610 ASCUS OF CERVIX WITH NEGATIVE HIGH RISK HPV: ICD-10-CM

## 2023-12-15 DIAGNOSIS — Z32.01 POSITIVE URINE PREGNANCY TEST: Primary | ICD-10-CM

## 2023-12-15 DIAGNOSIS — Z32.01 POSITIVE URINE PREGNANCY TEST: ICD-10-CM

## 2023-12-15 DIAGNOSIS — Z28.21 INFLUENZA VACCINATION DECLINED BY PATIENT: ICD-10-CM

## 2023-12-15 LAB
B-HCG SERPL-ACNC: ABNORMAL MIU/ML (ref 0–5)
BILIRUB UR QL STRIP: NEGATIVE
CLARITY UR: CLEAR
COLOR UR: NORMAL
GLUCOSE UR STRIP-MCNC: NEGATIVE MG/DL
HGB UR QL STRIP.AUTO: NEGATIVE
KETONES UR STRIP-MCNC: NEGATIVE MG/DL
LEUKOCYTE ESTERASE UR QL STRIP: NEGATIVE
NITRITE UR QL STRIP: NEGATIVE
PH UR STRIP.AUTO: 6.5 [PH]
PROT UR STRIP-MCNC: NEGATIVE MG/DL
SP GR UR STRIP.AUTO: 1.02 (ref 1–1.03)
UROBILINOGEN UR STRIP-ACNC: <2 MG/DL

## 2023-12-15 PROCEDURE — 99214 OFFICE O/P EST MOD 30 MIN: CPT | Performed by: FAMILY MEDICINE

## 2023-12-15 PROCEDURE — 81003 URINALYSIS AUTO W/O SCOPE: CPT | Performed by: FAMILY MEDICINE

## 2023-12-15 PROCEDURE — 36415 COLL VENOUS BLD VENIPUNCTURE: CPT

## 2023-12-15 PROCEDURE — 84702 CHORIONIC GONADOTROPIN TEST: CPT

## 2023-12-15 RX ORDER — PRENATAL VIT 91/IRON/FOLIC/DHA 28-975-200
COMBINATION PACKAGE (EA) ORAL
COMMUNITY
End: 2023-12-15 | Stop reason: SDUPTHER

## 2023-12-15 RX ORDER — PRENATAL VIT 91/IRON/FOLIC/DHA 28-975-200
1 COMBINATION PACKAGE (EA) ORAL DAILY
Qty: 90 EACH | Refills: 0 | Status: SHIPPED | OUTPATIENT
Start: 2023-12-15

## 2023-12-15 NOTE — PROGRESS NOTES
Name: Nora Hanks      : 1995      MRN: [id number]  Encounter Provider: Bárbara Diamond DO  Encounter Date: 12/15/2023   Encounter department: [address] [place] [address]     1. Positive urine pregnancy test  -     hCG, quantitative; Future  -     Ambulatory Referral to Obstetrics / Gynecology; Future  -     Prenatal MV-Min-Fe Fum-FA-DHA (Prenatal+DHA) 28-0.975 & 200 MG MISC; Take 1 each by mouth daily  -     UA (URINE) with reflex to Scope    2. Amenorrhea  -     hCG, quantitative; Future  -     Ambulatory Referral to Obstetrics / Gynecology; Future  -     UA (URINE) with reflex to Scope    3. Blood pressure elevated without history of HTN  -     Ambulatory Referral to Obstetrics / Gynecology; Future  -     UA (URINE) with reflex to Scope    4. ASCUS of cervix with negative high risk HPV  Comments:  10/2023  Orders:  -     Ambulatory Referral to Obstetrics / Gynecology; Future    5.  Influenza vaccination declined by patient           Subjective      Chief Complaint   Patient presents with    Pregnant     Positive pregnancy test, tested pos Tuesday        New problem focused visit- Did OTC urine pregnancy test on Tuesday and was positive  LMP 2023  Feels "a little like I'm hungover- headache, nauseous, tired"  Bp elevated on rooming without hx HTN - recheck same   Pt already started taking PNV's- states she also takes her regular MVI daily- I advise pt to stop this  She had her last PAP here in October- showed ASCUS low HPV risk      Review of Systems    Current Outpatient Medications on File Prior to Visit   Medication Sig    [DISCONTINUED] Prenatal MV-Min-Fe Fum-FA-DHA (Prenatal+DHA) 28-0.975 & 200 MG MISC Take by mouth       Objective     /88 (BP Location: Left arm, Patient Position: Sitting, Cuff Size: Standard)   Pulse 99   Temp (!) 97.4 °F (36.3 °C) (Tympanic)   Ht 5' 2" (1.575 m)   Wt 52.5 kg (115 lb 12.8 oz)   SpO2 100%   BMI 21.18 kg/m²     Physical Exam  Vitals and nursing note reviewed. Constitutional:       General: She is not in acute distress. Appearance: She is well-developed and well-groomed. She is not ill-appearing, toxic-appearing or diaphoretic. HENT:      Head: Normocephalic and atraumatic. Mouth/Throat:      Pharynx: Uvula midline. Neck:      Trachea: Phonation normal.   Pulmonary:      Effort: Pulmonary effort is normal.   Skin:     Coloration: Skin is not pale. Neurological:      General: No focal deficit present. Mental Status: She is alert and oriented to person, place, and time. Gait: Gait normal.   Psychiatric:         Mood and Affect: Mood normal.         Behavior: Behavior normal. Behavior is cooperative.        Bárbara Diamond DO

## 2023-12-22 ENCOUNTER — TELEPHONE (OUTPATIENT)
Dept: OBGYN CLINIC | Facility: CLINIC | Age: 28
End: 2023-12-22

## 2023-12-22 PROBLEM — R87.810 ASCUS WITH POSITIVE HIGH RISK HPV CERVICAL: Status: ACTIVE | Noted: 2023-12-15

## 2023-12-22 NOTE — TELEPHONE ENCOUNTER
Pt called. Pt has preg conf appointment 12/29. Pt states she is having light brown bleeding. Pt has completed a hcg quant 12/15 to confirm pregnancy. Please advise pt for bleeding.

## 2023-12-22 NOTE — TELEPHONE ENCOUNTER
Called pt. Rescheduled pt to be seen sooner 12/27. No available appointments today. Pt made aware to head to ED if bleeding becomes heavier.

## 2023-12-27 ENCOUNTER — OFFICE VISIT (OUTPATIENT)
Dept: OBGYN CLINIC | Facility: CLINIC | Age: 28
End: 2023-12-27
Payer: COMMERCIAL

## 2023-12-27 VITALS
DIASTOLIC BLOOD PRESSURE: 80 MMHG | HEIGHT: 62 IN | WEIGHT: 112.8 LBS | BODY MASS INDEX: 20.76 KG/M2 | HEART RATE: 96 BPM | SYSTOLIC BLOOD PRESSURE: 122 MMHG

## 2023-12-27 DIAGNOSIS — N92.6 MISSED PERIOD: Primary | ICD-10-CM

## 2023-12-27 PROCEDURE — 99213 OFFICE O/P EST LOW 20 MIN: CPT | Performed by: OBSTETRICS & GYNECOLOGY

## 2023-12-27 PROCEDURE — 87491 CHLMYD TRACH DNA AMP PROBE: CPT | Performed by: OBSTETRICS & GYNECOLOGY

## 2023-12-27 PROCEDURE — 76817 TRANSVAGINAL US OBSTETRIC: CPT | Performed by: OBSTETRICS & GYNECOLOGY

## 2023-12-27 PROCEDURE — 87591 N.GONORRHOEAE DNA AMP PROB: CPT | Performed by: OBSTETRICS & GYNECOLOGY

## 2023-12-27 NOTE — PROGRESS NOTES
OB/GYN  PRENATAL H&P VISIT  Gladys Church  2023  11:30 AM  Dr. Rima Ernst MD      SUBJECTIVE  Patient is a  here for initial prenatal H&P. She reports her LMP as 23; her menses are regular, q28-30d, and she is sure of the date.    She is currently doing well. She denies hx of STD/STI, including HSV. She denies a family history of inheritable conditions such as physical or intellectual disabilities, birth defects, blood disorders, heart or neural tube defects. Her partner's medical history is unremarkable. Her partner's family history is unremarkable She never had MRSA. She denies recent travel or travel planned in the near future. She denies use of nicotine or recreational drug use.     She denies vaginal bleeding, cramping, leakage, abnormal discharge. She had spotting a few days ago, but it is now resolved.    She endorses nausea but it is improving.    Patient's BMI is 21. Recommended weight gain is 25-35lbs.    OB History    Para Term  AB Living   1 0 0 0 0 0   SAB IAB Ectopic Multiple Live Births   0 0 0 0 0      # Outcome Date GA Lbr Kyle/2nd Weight Sex Delivery Anes PTL Lv   1 Current               Obstetric Comments   Menarche 12       Review of Systems   Constitutional:  Negative for chills and fever.   HENT:  Negative for ear pain and sore throat.    Eyes:  Negative for pain and visual disturbance.   Respiratory:  Negative for cough and shortness of breath.    Cardiovascular:  Negative for chest pain and palpitations.   Gastrointestinal:  Negative for abdominal pain and vomiting.   Genitourinary:  Negative for dysuria and hematuria.   Musculoskeletal:  Negative for arthralgias and back pain.   Skin:  Negative for color change and rash.   Neurological:  Negative for seizures and syncope.   All other systems reviewed and are negative.      Past Medical History:   Diagnosis Date    ASCUS of cervix with negative high risk HPV 12/15/2023    Asthma     Dysmenorrhea         Past Surgical History:   Procedure Laterality Date    NO PAST SURGERIES         Social History     Socioeconomic History    Marital status: Single     Spouse name: Not on file    Number of children: Not on file    Years of education: Not on file    Highest education level: Not on file   Occupational History    Not on file   Tobacco Use    Smoking status: Never    Smokeless tobacco: Never   Vaping Use    Vaping status: Never Used   Substance and Sexual Activity    Alcohol use: Not Currently     Comment: Social    Drug use: Not Currently     Types: Marijuana    Sexual activity: Yes     Partners: Male     Birth control/protection: None   Other Topics Concern    Not on file   Social History Narrative    Uses seatbelts     Social Determinants of Health     Financial Resource Strain: Not on file   Food Insecurity: Not on file   Transportation Needs: No Transportation Needs (2020)    PRAPARE - Transportation     Lack of Transportation (Medical): No     Lack of Transportation (Non-Medical): No   Physical Activity: Not on file   Stress: Not on file   Social Connections: Not on file   Intimate Partner Violence: Not on file   Housing Stability: Not on file       OBJECTIVE  Vitals:    23 1126   BP: 122/80   Pulse: 96     Physical Exam  Constitutional:       Appearance: Normal appearance.   Genitourinary:      Vulva normal.      No vaginal discharge.   HENT:      Head: Normocephalic and atraumatic.   Cardiovascular:      Rate and Rhythm: Normal rate.   Pulmonary:      Effort: Pulmonary effort is normal. No respiratory distress.   Abdominal:      Palpations: Abdomen is soft.      Tenderness: There is no abdominal tenderness.   Musculoskeletal:         General: Normal range of motion.   Neurological:      Mental Status: She is alert.   Skin:     General: Skin is warm and dry.         ASSESSMENT AND PLAN    28 y.o., , with /80 (BP Location: Left arm, Patient Position: Sitting, Cuff Size: Standard)    "Pulse 96   Ht 5' 2\" (1.575 m)   Wt 51.2 kg (112 lb 12.8 oz)   LMP 11/05/2023 (Exact Date) , here for her prenatal H&P.      1. Pregnancy: Patient has been counseled about diet, exercise, weight gain, foods to avoid, breastfeeding, vaccines in pregnancy, trisomy screening, vector borne illness and prevention, routine dental care, travel precautions to include seat belt use and VTE risk reduction.  Patient has been provided our pregnancy packet which includes how and when to contact providers, medication recommendations, dietary suggestions, breastfeeding information as well as websites for additional information, hospital and delivery concerns, etc.     2. Screening: Pap smear ASCUS/HPV HR other in October; recommend colposcopy. GC/CT collected. Genetic testing options reviewed with patient.     3. Consents: Sign delivery consent at 32 weeks    4. Labor: Reviewed delivery planning at [facility]    5. Postpartum: Patient plans on breastfeeding. Different methods of contraception were discussed with patient, including progesterone only oral pills, depo provera, nexplanon, mirena, and paragard. Patient would like to use POPs during postpartum phase.    6. Follow up: return for dating US; Precautions regarding labor, leakage, bleeding, and fetal movement reviewed.    Rima Ernst MD  12/27/2023  11:30 AM       "

## 2023-12-27 NOTE — PROGRESS NOTES
FIRST TRIMESTER OBSTETRIC ULTRASOUND  12/27/2023   Klever Abarca MD     INDICATION: Amenorrhea, viability  .  COMPARISON: None.     TECHNIQUE:   Transvaginal imaging was performed to assess the gestation, myometrial/endometrial architecture and ovarian parenchymal detail.    The study includes volumetric sweeps and traditional still imaging technique.      FINDINGS:     A single intrauterine gestation is identified.  Cardiac activity is detected at 156 bpm.      YOLK SAC:  Present and normal in size and appearance.  MEAN CROWN RUMP LENGTH:  13.6 mm = 7 weeks 4 days   AMNIOTIC FLUID/SAC SHAPE:  Within expected normal range.     UTERUS/ADNEXA:   No adnexal mass or pathologic cyst.  No free fluid identified.     IMPRESSION:     Single intrauterine pregnancy of 7 weeks 4d gestational age by US.  Fetal cardiac activity detected.  No adnexal masses seen.  LMP 11/5/23  AMY 8/11/24 by LMP per ACOG guidelines

## 2023-12-28 LAB
C TRACH DNA SPEC QL NAA+PROBE: NEGATIVE
N GONORRHOEA DNA SPEC QL NAA+PROBE: NEGATIVE

## 2024-01-09 ENCOUNTER — LAB (OUTPATIENT)
Dept: LAB | Facility: CLINIC | Age: 29
End: 2024-01-09
Payer: COMMERCIAL

## 2024-01-09 ENCOUNTER — OFFICE VISIT (OUTPATIENT)
Dept: OBGYN CLINIC | Facility: CLINIC | Age: 29
End: 2024-01-09
Payer: COMMERCIAL

## 2024-01-09 VITALS
BODY MASS INDEX: 21.2 KG/M2 | SYSTOLIC BLOOD PRESSURE: 132 MMHG | DIASTOLIC BLOOD PRESSURE: 76 MMHG | HEIGHT: 62 IN | WEIGHT: 115.2 LBS

## 2024-01-09 DIAGNOSIS — O20.9 BLEEDING IN EARLY PREGNANCY: Primary | ICD-10-CM

## 2024-01-09 DIAGNOSIS — O20.9 BLEEDING IN EARLY PREGNANCY: ICD-10-CM

## 2024-01-09 LAB
ABO GROUP BLD: NORMAL
BLD GP AB SCN SERPL QL: NEGATIVE
RH BLD: POSITIVE
SPECIMEN EXPIRATION DATE: NORMAL

## 2024-01-09 PROCEDURE — 86901 BLOOD TYPING SEROLOGIC RH(D): CPT

## 2024-01-09 PROCEDURE — 86900 BLOOD TYPING SEROLOGIC ABO: CPT

## 2024-01-09 PROCEDURE — 99213 OFFICE O/P EST LOW 20 MIN: CPT | Performed by: NURSE PRACTITIONER

## 2024-01-09 PROCEDURE — 36415 COLL VENOUS BLD VENIPUNCTURE: CPT

## 2024-01-09 PROCEDURE — 86850 RBC ANTIBODY SCREEN: CPT

## 2024-01-09 NOTE — PROGRESS NOTES
"Assessment/Plan:    1. Bleeding in early pregnancy  In 27 yo  at 9w2d gestation, IUP w/ normal FHTs proven by US.  Exam today normal/ reassuring.  Will check T&S, explained need for Rhogam if Rh negative.  RV tomorrow for US to r/o subchorionic hem or SAB.  Advised to call if increasing symptoms of bleeding or pain.  Advised to avoid intercourse/ any per vagina for at least 2 weeks until without further bleeding.  Avoid heavy lifting at work-- given letter for limitations for her employer.    - Type and screen; Future      Subjective:      Patient ID: Gladys Church is a 28 y.o. female.    HPI  PROBLEM VISIT  CC: bleeding in pregnancy    27 yo  presents for evaluation of vaginal bleeding.  She is currently 9w 2d gestation.  Reports that she experienced vaginal spotting this past Friday, Saturday and into .  Brown/light pink, changed liner twice per day, most noticeable upon wiping.  Denies any pelvic cramping.  Denies having had sex or forced BMs prior to the bleeding.  She is a  and has to lift, sometime large dogs.  She has been able to avoid this though.    23 dating US: IUP at 7w 4d gestation, fhts 156.  AMY 8/11/24  12/15/23 Quant HCG 28,476    Had spotting Friday, Sat and Sun.  Called yesterday.  The following portions of the patient's history were reviewed and updated as appropriate: allergies, current medications, past family history, past medical history, past social history, past surgical history, and problem list.    Review of Systems   Constitutional:  Negative for chills and fever.   Genitourinary:  Positive for vaginal bleeding (4 days ago, x 3 d, very light.  none since  (2 d ago)). Negative for dyspareunia, dysuria, frequency, genital sores, hematuria, menstrual problem, pelvic pain, urgency, vaginal discharge and vaginal pain.         Objective:    /76 (BP Location: Right arm, Patient Position: Sitting, Cuff Size: Standard)   Ht 5' 2\" (1.575 m)   Wt " 52.3 kg (115 lb 3.2 oz)   LMP 11/05/2023 (Exact Date)   BMI 21.07 kg/m²      Physical Exam  Constitutional:       General: She is not in acute distress.     Appearance: Normal appearance. She is well-developed and normal weight. She is not ill-appearing or diaphoretic.   HENT:      Head: Normocephalic and atraumatic.   Eyes:      Pupils: Pupils are equal, round, and reactive to light.   Pulmonary:      Effort: Pulmonary effort is normal.   Abdominal:      General: Abdomen is flat.      Palpations: Abdomen is soft.   Genitourinary:     General: Normal vulva.      Exam position: Lithotomy position.      Labia:         Right: No rash, tenderness, lesion or injury.         Left: No rash, tenderness, lesion or injury.       Vagina: No signs of injury and foreign body. No vaginal discharge, erythema, tenderness or bleeding.      Cervix: No cervical motion tenderness, discharge or friability.      Uterus: Not enlarged and not tender.       Adnexa:         Right: No mass or tenderness.          Left: No mass or tenderness.        Comments: No blood noted in vaginal vault.  Cervix long and closed.  Skin:     General: Skin is warm and dry.   Neurological:      General: No focal deficit present.      Mental Status: She is alert and oriented to person, place, and time.   Psychiatric:         Mood and Affect: Mood normal.         Behavior: Behavior normal.         Thought Content: Thought content normal.         Judgment: Judgment normal.

## 2024-01-10 ENCOUNTER — ULTRASOUND (OUTPATIENT)
Dept: OBGYN CLINIC | Facility: CLINIC | Age: 29
End: 2024-01-10
Payer: COMMERCIAL

## 2024-01-10 DIAGNOSIS — O20.9 BLEEDING IN EARLY PREGNANCY: Primary | ICD-10-CM

## 2024-01-10 NOTE — PROGRESS NOTES
Procedures    Serial transvaginal images were obtained through the gravid maternal uterus. The patient's LMP was 11/5/2023 with an AMY of  8/11/2024 and a gestational age of  9w3d (based on LMP).   The indication for today's examination is to confirm fetal size and viability.    CRL=  2.64cm   9w3d     AMY 8/11/2024 by US and LMP  YS=  3.3mm  FHR=  168bpm    The uterus and bilateral ovaries appear within normal limits. The left ovary demonstrates a 2.7cm corpus luteal cyst. No subchorionic bleed noted.     Uterus= 10.18 x 8.11 x 8.18cm  Rt Ovary= 4.66 x 2.87 x 3.50cm  Lt. Ovary= 5.56 x 2.78 x 2.88cm    Impression: Single, viable IUP. No bleed.    Le Kumar RDMS    GE [id number] transvaginal transducer Serial #[id number] was used to perform the examination today and subsequently followed with high level disinfection utilizing Trophon EPR procedure.    [facility]  [facility]  [address] [place] [address], [place] [address]  Phone  [phone]  Fax  [phone]

## 2024-01-15 ENCOUNTER — INITIAL PRENATAL (OUTPATIENT)
Dept: OBGYN CLINIC | Facility: CLINIC | Age: 29
End: 2024-01-15

## 2024-01-15 VITALS — WEIGHT: 115 LBS | HEIGHT: 62 IN | BODY MASS INDEX: 21.16 KG/M2

## 2024-01-15 DIAGNOSIS — Z34.01 ENCOUNTER FOR SUPERVISION OF NORMAL FIRST PREGNANCY IN FIRST TRIMESTER: Primary | ICD-10-CM

## 2024-01-15 PROCEDURE — OBC

## 2024-01-15 NOTE — PROGRESS NOTES
The patient was identified by name and date of birth and was informed that this is a virtual visit being conducted through a secure, HIPAA-complaint platform. I am in a private office space with the door closed. Patient acknowledged understanding of privacy.  She agrees to proceed and is aware that she may discontinue the visit at any time.    OB INTAKE INTERVIEW    January 15, 2024    OB History    Para Term  AB Living   1 0 0 0 0 0   SAB IAB Ectopic Multiple Live Births   0 0 0 0 0      # Outcome Date GA Lbr Kyle/2nd Weight Sex Delivery Anes PTL Lv   1 Current               Obstetric Comments   Menarche 12       Past Medical History:   Diagnosis Date    ASCUS of cervix with negative high risk HPV 12/15/2023    Asthma     Dysmenorrhea     Varicella        Past Surgical History:   Procedure Laterality Date    NO PAST SURGERIES           Current Outpatient Medications:     Prenatal MV-Min-Fe Fum-FA-DHA (Prenatal+DHA) 28-0.975 & 200 MG MISC, Take 1 each by mouth daily, Disp: 90 each, Rfl: 0    Allergies   Allergen Reactions    Cat Hair Extract     Dust Mite Extract     Other Allergic Rhinitis       family history includes Asthma in her father; Cancer in her maternal grandmother; Diabetes in her family; Heart disease in her maternal grandmother and paternal grandmother; Hypertension in her maternal grandfather and mother.    Estimated Date of Delivery: 2024   Last Menstrual Period: Patient's last menstrual period was 2023 (exact date).    Gladys Church is a 28 y.o. female  1 para 0 pregnant female who presents for her obstetrical intake. This pregnancy was not planned.  Father of the baby is involved.    Pregnancy symptoms: breast tenderness, fatigue, nausea, and frequent urination    Vitals:             BMI: Body mass index is Body mass index is 21.03 kg/m².   Discussed appropriate weight gain in pregnancy based on pre-gravid BMI.     Diabetes  First degree relative with type 2  diabetes:no              Pregestational DM:  no              hx of GDM: no              hx of PCOS: no              prior hx of LGA/macrosomia (weight ore than 9 lbs) :no           Hypertension   Age 35 or older:no   Obesity (BMI over 30):no              Hx of chronic HTN: no              hx of gestational HTN:no              hx of preeclampsia, eclampsia, or HELLP syndrome: no              Family h/o preeclampsia:no    Autoimmune disease (systemic lupus erythematosus, antiphospholipid antibody syndrome):no   Nulliparity:YES              Multifetal gestation: no  More than 10 year pregnancy interval:no  Previous IUGR, low birthweight or small for gestational age:no     Infection Screening              Does the pt have a hx of MRSA? no              Recent travel outside of [place]? no     Thyroid- if yes order TSH with reflex T4  History of thyroid disease no    Bleeding Disorder or Hx of DVT-patient or first degree relative with history of. Order the following if not done previously.   (Factor V, antithrombin III, prothrombin gene mutation, protein C and S Ag, lupus anticoagulant, anticardiolipin, beta-2 glycoprotein)   no    OB/GYN-  Date of last pap on file:  10/16/2023-ASCUS of cervix with negative high risk HPV-[place] scheduled.  History of abnormal pap smear YES  History of HPV YES-positive other HPV  History of Herpes/HSV no  History of other STI (gonorrhea, chlamydia, trich) no    History of blood transfusion no  Ok for blood transfusion Yes     Substance screening  Social History     Tobacco Use    Smoking status: Never    Smokeless tobacco: Never   Vaping Use    Vaping status: Never Used   Substance Use Topics    Alcohol use: Not Currently     Comment: Social    Drug use: Not Currently     Types: Marijuana       Parents:  Did any of your parents have a problem with alcohol or other drug use? YES  Partner: Does your partner have a problem with alcohol or drug use? no  Past: In the past, have you had difficulties  in your life because of alcohol or other drugs, including prescription medication? no  Present: In the past month have you drunk any alcohol or used other drugs? no    Genetic/MFM-  See prenatal genetic history screening in the prenatal episode for genetic history.   Discussed carrier screening and consultation with MFM. Patient is interested.  Referral to MFM given for first trimester screening and anatomy (level 2) scan.  Patient provided with contact information for MFM and advised to call to schedule appointment with them.   Cystic Fibrosis Screening offered.  Patient was advised to check insurance coverage prior to testing.  Patient is not interested.       Orders Placed This Encounter   Procedures    Urine culture    Hgb Fractionation Cascade    HIV 1/2 AG/AB w Reflex [facility] for 2 yr old and above    Hepatitis C antibody    UA (URINE) with reflex to Scope    Hepatitis B surface antigen    CBC and differential    Rubella antibody, IgG    RPR-Syphilis Screening (Total Syphilis IGG/IGM)    Hepatitis B surface antibody    Anemia Panel w/Reflex, OB    Ambulatory Referral to Maternal Fetal Medicine    Type and screen     Prenatal lab work scripts given to patient for prenatal testing.  Patient was advised to have initial prenatal blood work, that was ordered today, done within the next week.  Verbalized understanding.    Breast Feeding  Breastfeeding benefits discussed and patient does plan to breastfeed.    Immunizations:  Discussed Flu, COVID, and Tdap, vaccinations. Vaccinations Immunizations Activity    Interview education  HIV and other prenatal labs and testing discussed.    Genetic testing discussed.  Patient instructed to check insurance coverage for testing and capitated lab.    Indications for ultrasonography reviewed.    Use of any medications (including supplements, vitamins, herbs or OTC drugs) discussed.  Reinforced daily use of prenatal vitamin.  Influenza, Covid, and Tdap vaccines counseled and  recommendations reviewed.    Weight gain counseling discussed including nutrition counseling; special diet, dietary precautions (mercury, listeriosis).  Reviewed exercise recommendations and routine restrictions for activity including no lifting greater than 20 lbs.     Environmental/work hazards reviewed including temperature guidelines and no prolonged stationary standing.  Avoidance of saunas, hot tubs, and tanning beds reviewed.  Toxoplasmosis precautions (cats/raw meat/unwashed vegetables) discussed.  Tobacco/smoking, alcohol, and illicit/recreation drug usage reviewed.  Travel safety precautions reviewed including avoiding travel where risk of congenitally transmitted infection(s) is high.     Pregnancy packet/folder provided and review with patient.    Information on childbirth classes/hospital facilities and Baby and Me resources provided.    The patient was oriented to our practice, reviewed delivering physicians and [facility] for Delivery. Patient instructed to always contact the office via phone with any urgent needs or concerns and not to utilize Mychart for emergencies.  Anticipated course of prenatal care including how and when to contact providers reviewed.  Will return to the office for first routine prenatal appointment. This appointment is scheduled.  All questions answered. Patient verbalized understanding.    Additional details that I feel the provider should be aware of: no additional    Interviewed by: CINDA CHAUDHARI LPN

## 2024-01-15 NOTE — PATIENT INSTRUCTIONS
Congratulations on your pregnancy!  We thank you for allowing us to participate in your care.    NEXT STEPS  Go to the lab to have your prenatal bloodwork competed if you have not already done so.  Call M to schedule your upcoming appointments with them.  We have entered a referral to their office and they will know how to schedule you appropriately.    Contact information for Maternal Fetal Medicine is located in your prenatal folder.  The main phone number to their office is [phone].  The scheduling of your appointment is time sensitive, so please call their office today or tomorrow to set up your appointment.   Return to our office for your first routine prenatal visit which was scheduled today.   Call our office at [phone] if you have any problems or concerns prior to your routine scheduled appointment.    Remember to only use [employer] for none urgent concerns or questions.    Please click on the links below to review our Pregnancy Essential Guide and for a virtual tour of Labor and Delivery at our [facility].      [facility]'s Pregnancy Essentials Guide  [facility] ([email or web address])      Virtual Tour of [facility] [facility]   [facility]  [address] [place] [address]

## 2024-01-17 ENCOUNTER — TELEPHONE (OUTPATIENT)
Age: 29
End: 2024-01-17

## 2024-01-17 NOTE — TELEPHONE ENCOUNTER
Left patient a message that her 2/1 [facility] appointment had to be rescheduled to 12:45 PM.  The new time, date and location were provided.  The patient has been instructed to please call us back at [phone] with any questions or concerns.

## 2024-01-22 ENCOUNTER — LAB (OUTPATIENT)
Dept: LAB | Age: 29
End: 2024-01-22
Payer: COMMERCIAL

## 2024-01-22 DIAGNOSIS — Z34.01 ENCOUNTER FOR SUPERVISION OF NORMAL FIRST PREGNANCY IN FIRST TRIMESTER: ICD-10-CM

## 2024-01-22 LAB
ABO GROUP BLD: NORMAL
AMORPH URATE CRY URNS QL MICRO: ABNORMAL
BACTERIA UR QL AUTO: ABNORMAL /HPF
BASOPHILS # BLD AUTO: 0.05 THOUSANDS/ÂΜL (ref 0–0.1)
BASOPHILS NFR BLD AUTO: 1 % (ref 0–1)
BILIRUB UR QL STRIP: NEGATIVE
BLD GP AB SCN SERPL QL: NEGATIVE
CLARITY UR: ABNORMAL
COLOR UR: ABNORMAL
EOSINOPHIL # BLD AUTO: 0.34 THOUSAND/ÂΜL (ref 0–0.61)
EOSINOPHIL NFR BLD AUTO: 4 % (ref 0–6)
ERYTHROCYTE [DISTWIDTH] IN BLOOD BY AUTOMATED COUNT: 13.9 % (ref 11.6–15.1)
GLUCOSE UR STRIP-MCNC: NEGATIVE MG/DL
HBV SURFACE AB SER-ACNC: 75.4 MIU/ML
HBV SURFACE AG SER QL: NORMAL
HCT VFR BLD AUTO: 38.1 % (ref 34.8–46.1)
HCV AB SER QL: NORMAL
HGB BLD-MCNC: 12.6 G/DL (ref 11.5–15.4)
HGB UR QL STRIP.AUTO: NEGATIVE
HIV 1+2 AB+HIV1 P24 AG SERPL QL IA: NORMAL
HIV 2 AB SERPL QL IA: NORMAL
HIV1 AB SERPL QL IA: NORMAL
HIV1 P24 AG SERPL QL IA: NORMAL
IMM GRANULOCYTES # BLD AUTO: 0.03 THOUSAND/UL (ref 0–0.2)
IMM GRANULOCYTES NFR BLD AUTO: 0 % (ref 0–2)
KETONES UR STRIP-MCNC: NEGATIVE MG/DL
LEUKOCYTE ESTERASE UR QL STRIP: ABNORMAL
LYMPHOCYTES # BLD AUTO: 2.01 THOUSANDS/ÂΜL (ref 0.6–4.47)
LYMPHOCYTES NFR BLD AUTO: 23 % (ref 14–44)
MCH RBC QN AUTO: 28.6 PG (ref 26.8–34.3)
MCHC RBC AUTO-ENTMCNC: 33.1 G/DL (ref 31.4–37.4)
MCV RBC AUTO: 87 FL (ref 82–98)
MONOCYTES # BLD AUTO: 0.58 THOUSAND/ÂΜL (ref 0.17–1.22)
MONOCYTES NFR BLD AUTO: 7 % (ref 4–12)
NEUTROPHILS # BLD AUTO: 5.64 THOUSANDS/ÂΜL (ref 1.85–7.62)
NEUTS SEG NFR BLD AUTO: 65 % (ref 43–75)
NITRITE UR QL STRIP: NEGATIVE
NON-SQ EPI CELLS URNS QL MICRO: ABNORMAL /HPF
NRBC BLD AUTO-RTO: 0 /100 WBCS
PH UR STRIP.AUTO: 7 [PH]
PLATELET # BLD AUTO: 388 THOUSANDS/UL (ref 149–390)
PMV BLD AUTO: 9.9 FL (ref 8.9–12.7)
PROT UR STRIP-MCNC: NEGATIVE MG/DL
RBC # BLD AUTO: 4.4 MILLION/UL (ref 3.81–5.12)
RBC #/AREA URNS AUTO: ABNORMAL /HPF
RH BLD: POSITIVE
RUBV IGG SERPL IA-ACNC: 149.7 IU/ML
SP GR UR STRIP.AUTO: 1.02 (ref 1–1.03)
SPECIMEN EXPIRATION DATE: NORMAL
TREPONEMA PALLIDUM IGG+IGM AB [PRESENCE] IN SERUM OR PLASMA BY IMMUNOASSAY: NORMAL
UROBILINOGEN UR STRIP-ACNC: <2 MG/DL
WBC # BLD AUTO: 8.65 THOUSAND/UL (ref 4.31–10.16)
WBC #/AREA URNS AUTO: ABNORMAL /HPF

## 2024-01-22 PROCEDURE — 86762 RUBELLA ANTIBODY: CPT

## 2024-01-22 PROCEDURE — 85025 COMPLETE CBC W/AUTO DIFF WBC: CPT

## 2024-01-22 PROCEDURE — 87086 URINE CULTURE/COLONY COUNT: CPT

## 2024-01-22 PROCEDURE — 36415 COLL VENOUS BLD VENIPUNCTURE: CPT

## 2024-01-22 PROCEDURE — 87340 HEPATITIS B SURFACE AG IA: CPT

## 2024-01-22 PROCEDURE — 86901 BLOOD TYPING SEROLOGIC RH(D): CPT

## 2024-01-22 PROCEDURE — 86706 HEP B SURFACE ANTIBODY: CPT

## 2024-01-22 PROCEDURE — 86803 HEPATITIS C AB TEST: CPT

## 2024-01-22 PROCEDURE — 83020 HEMOGLOBIN ELECTROPHORESIS: CPT

## 2024-01-22 PROCEDURE — 86780 TREPONEMA PALLIDUM: CPT

## 2024-01-22 PROCEDURE — 87389 HIV-1 AG W/HIV-1&-2 AB AG IA: CPT

## 2024-01-22 PROCEDURE — 86900 BLOOD TYPING SEROLOGIC ABO: CPT

## 2024-01-22 PROCEDURE — 86850 RBC ANTIBODY SCREEN: CPT

## 2024-01-22 PROCEDURE — 81001 URINALYSIS AUTO W/SCOPE: CPT

## 2024-01-23 LAB — BACTERIA UR CULT: NORMAL

## 2024-01-25 LAB
HGB A MFR BLD: 2.7 % (ref 1.8–3.2)
HGB A MFR BLD: 97.3 % (ref 96.4–98.8)
HGB F MFR BLD: 0 % (ref 0–2)
HGB FRACT BLD-IMP: NORMAL
HGB S MFR BLD: 0 %

## 2024-01-31 PROBLEM — Z3A.12 12 WEEKS GESTATION OF PREGNANCY: Status: ACTIVE | Noted: 2024-01-31

## 2024-01-31 NOTE — PROGRESS NOTES
Please refer to the [facility] ultrasound report in Ob Procedures for additional information regarding today's visit

## 2024-02-01 ENCOUNTER — ROUTINE PRENATAL (OUTPATIENT)
Dept: PERINATAL CARE | Facility: OTHER | Age: 29
End: 2024-02-01
Payer: COMMERCIAL

## 2024-02-01 VITALS
DIASTOLIC BLOOD PRESSURE: 72 MMHG | SYSTOLIC BLOOD PRESSURE: 130 MMHG | HEIGHT: 62 IN | WEIGHT: 121.4 LBS | HEART RATE: 105 BPM | BODY MASS INDEX: 22.34 KG/M2

## 2024-02-01 DIAGNOSIS — Z3A.12 12 WEEKS GESTATION OF PREGNANCY: ICD-10-CM

## 2024-02-01 DIAGNOSIS — Z36.0 ENCOUNTER FOR ANTENATAL SCREENING FOR CHROMOSOMAL ANOMALIES: ICD-10-CM

## 2024-02-01 DIAGNOSIS — Z36.82 ENCOUNTER FOR ANTENATAL SCREENING FOR NUCHAL TRANSLUCENCY: Primary | ICD-10-CM

## 2024-02-01 PROCEDURE — 36415 COLL VENOUS BLD VENIPUNCTURE: CPT | Performed by: OBSTETRICS & GYNECOLOGY

## 2024-02-01 PROCEDURE — 76813 OB US NUCHAL MEAS 1 GEST: CPT | Performed by: OBSTETRICS & GYNECOLOGY

## 2024-02-01 PROCEDURE — 76801 OB US < 14 WKS SINGLE FETUS: CPT | Performed by: OBSTETRICS & GYNECOLOGY

## 2024-02-01 PROCEDURE — 99203 OFFICE O/P NEW LOW 30 MIN: CPT | Performed by: OBSTETRICS & GYNECOLOGY

## 2024-02-01 NOTE — LETTER
February 1, 2024     Beth Echevarria MD  [address]  [address]  [place] [address]    Patient: Gladys Church   YOB: 1995   Date of Visit: 2/1/2024       Dear Dr. Echevarria:    Thank you for referring Gladys Church to me for evaluation. Below are my notes for this consultation.    If you have questions, please do not hesitate to call me. I look forward to following your patient along with you.         Sincerely,        Ashvin Mayo MD        CC: No Recipients    Ashvin Mayo MD  2/1/2024 12:54 PM  Sign when Signing Visit  Please refer to the [facility] ultrasound report in Ob Procedures for additional information regarding today's visit

## 2024-02-01 NOTE — PROGRESS NOTES
"Patient chose to have [employer] Non-invasive Prenatal Screen with fetal sex.   Patient choose billed thru insurance. .   Patient assistance program (PAP) application provided to patient no.  PAP sent with specimen No.     Patient given brochure and is aware [employer] will contact their insurance and coordinate coverage.  Patient made aware she will receive a text message and e-mail from [employer].   Patient informed text/phone call message will come from area code  \"415\".  Provided [employer] Client Services # [phone] and web site at [email or web address].   \"[place] your test online\" card with barcode and test tube ID provided to patient.   Reviewed [employer]'s web site states 5-7 business days for results via their portal.   [employer] message will be sent to patient when [facility] receives results /provider reviews.    2 vials of blood drawn from  right arm by Viji RAMIREZ RN.   Patient tolerated blood draw without difficulty.  Specimens labeled with patient identifiers (name, date of birth, specimen collection date), order and specimen were verified with patient, packed and sent via [employer] lab .  FED EX  tracking #  [id number]  Copy of lab order scanned to Epic media.    Maternal Fetal Medicine will have results in approximately 7-10 business days and will call patient or notify via [employer].  Patient aware viewing lab result online will reveal fetal sex if ordered.    Patient verbalized understanding of all instructions and no questions at this time.   "

## 2024-02-02 ENCOUNTER — PROCEDURE VISIT (OUTPATIENT)
Dept: OBGYN CLINIC | Facility: CLINIC | Age: 29
End: 2024-02-02
Payer: COMMERCIAL

## 2024-02-02 VITALS
HEART RATE: 92 BPM | BODY MASS INDEX: 21.9 KG/M2 | WEIGHT: 119 LBS | DIASTOLIC BLOOD PRESSURE: 68 MMHG | SYSTOLIC BLOOD PRESSURE: 100 MMHG | HEIGHT: 62 IN

## 2024-02-02 DIAGNOSIS — Z34.91 ENCOUNTER FOR PREGNANCY RELATED EXAMINATION IN FIRST TRIMESTER: Primary | ICD-10-CM

## 2024-02-02 DIAGNOSIS — R87.610 ASCUS WITH POSITIVE HIGH RISK HPV CERVICAL: ICD-10-CM

## 2024-02-02 DIAGNOSIS — R87.810 ASCUS WITH POSITIVE HIGH RISK HPV CERVICAL: ICD-10-CM

## 2024-02-02 PROBLEM — Z32.01 POSITIVE URINE PREGNANCY TEST: Status: RESOLVED | Noted: 2023-12-15 | Resolved: 2024-02-02

## 2024-02-02 PROCEDURE — PNV: Performed by: OBSTETRICS & GYNECOLOGY

## 2024-02-02 PROCEDURE — 88360 TUMOR IMMUNOHISTOCHEM/MANUAL: CPT | Performed by: PATHOLOGY

## 2024-02-02 PROCEDURE — 57455 BIOPSY OF CERVIX W/SCOPE: CPT | Performed by: OBSTETRICS & GYNECOLOGY

## 2024-02-02 PROCEDURE — 88305 TISSUE EXAM BY PATHOLOGIST: CPT | Performed by: PATHOLOGY

## 2024-02-02 PROCEDURE — 88342 IMHCHEM/IMCYTCHM 1ST ANTB: CPT | Performed by: PATHOLOGY

## 2024-02-02 NOTE — PROGRESS NOTES
"   Colposcopy     Date/Time  2/2/2024 10:15 AM     Universal Protocol   Consent: Verbal consent obtained. Written consent obtained.  Risks and benefits: risks, benefits and alternatives were discussed  Consent given by: patient  Time out: Immediately prior to procedure a \"time out\" was called to verify the correct patient, procedure, equipment, support staff and site/side marked as required.  Patient understanding: patient states understanding of the procedure being performed  Patient consent: the patient's understanding of the procedure matches consent given  Procedure consent: procedure consent matches procedure scheduled  Relevant documents: relevant documents present and verified  Required items: required blood products, implants, devices, and special equipment available  Patient identity confirmed: verbally with patient     Performed by  Rima Ernst MD   Authorized by  Rima Ernst MD     Pre-procedure details      Pre-procedure timeout performed: yes      Prepped with: acetic acid     Indication    ASC-US   Procedure Details   Procedure: Colposcopy w/ biopsy of cervix      Under satisfactory analgesia the patient was prepped and draped in the dorsal lithotomy position: yes      [place] speculum was placed in the vagina: yes      Under colposcopic examination the transition zone was seen in entirety: yes      Intracervical block was performed: no      Cervical biopsy performed with a cervical biopsy punch: yes      Tampon inserted: no      Monsel's solution was applied: yes      Biopsy(s): yes      Location:  12:00    Specimen to pathology: yes     Post-procedure      Findings: Bleeding and White epithelium      Impression: Low grade cervical dysplasia           "

## 2024-02-02 NOTE — PROGRESS NOTES
Assessment & Plan  29 y.o.  at 12w5d presenting for routine prenatal visit.     Problem List       Chronic bilateral low back pain with bilateral sciatica    Intermittent left lower quadrant abdominal pain    Bilateral sciatica    Asthma    Dysmenorrhea    Pelvic pain in female    Liver lesion    Amenorrhea    Blood pressure elevated without history of HTN    ASCUS with positive high risk HPV cervical    Overview     From Pap smear with PCP in 2023  Recommend colposcopy         12 weeks gestation of pregnancy    Overview     Prenatal labs wnl  MSAFP [ ]  Contraception [ ]  Delivery consent [ ]  GBS [ ]  Tdap [ ]          Other Visit Diagnoses       Encounter for pregnancy related examination in first trimester    -  Primary          ____________________________________________________________  Subjective  She is without complaint.   She denies contractions, loss of fluid, or vaginal bleeding.   She feels regular fetal movements.     Objective  LMP 2023 (Exact Date)   FHR: 150 bpm    Physical Exam  Constitutional:       Appearance: Normal appearance.   Genitourinary:      Vulva normal.      No vaginal discharge.   HENT:      Head: Normocephalic and atraumatic.   Cardiovascular:      Rate and Rhythm: Normal rate.   Pulmonary:      Effort: Pulmonary effort is normal. No respiratory distress.   Abdominal:      Palpations: Abdomen is soft.      Tenderness: There is no abdominal tenderness.   Musculoskeletal:         General: Normal range of motion.   Neurological:      Mental Status: She is alert.   Skin:     General: Skin is warm and dry.          Patient's Active Problem List  Patient Active Problem List   Diagnosis    Chronic bilateral low back pain with bilateral sciatica    Intermittent left lower quadrant abdominal pain    Bilateral sciatica    Asthma    Dysmenorrhea    Pelvic pain in female    Liver lesion    Amenorrhea    Blood pressure elevated without history of HTN    ASCUS with positive  high risk HPV cervical    12 weeks gestation of pregnancy       Rima Ernst MD  2/2/2024  10:08 AM

## 2024-02-06 ENCOUNTER — PATIENT MESSAGE (OUTPATIENT)
Dept: OBGYN CLINIC | Facility: CLINIC | Age: 29
End: 2024-02-06

## 2024-02-06 ENCOUNTER — OFFICE VISIT (OUTPATIENT)
Dept: OBGYN CLINIC | Facility: CLINIC | Age: 29
End: 2024-02-06
Payer: COMMERCIAL

## 2024-02-06 VITALS — WEIGHT: 118 LBS | BODY MASS INDEX: 21.58 KG/M2 | SYSTOLIC BLOOD PRESSURE: 112 MMHG | DIASTOLIC BLOOD PRESSURE: 64 MMHG

## 2024-02-06 DIAGNOSIS — N88.9 CERVICAL LESION: Primary | ICD-10-CM

## 2024-02-06 PROCEDURE — 99213 OFFICE O/P EST LOW 20 MIN: CPT | Performed by: OBSTETRICS & GYNECOLOGY

## 2024-02-06 NOTE — PROGRESS NOTES
Patient is a 29 y.o.  with Patient's last menstrual period was 2023 (exact date). who presents requesting evaluation of bright red vaginal bleeding today. Pt reports she had a colposcopy on Friday with Dr. Ernst and had brown coffee ground discharge as expected. This morning she had a sudden gush of bright red bleeding that filled up a regular pad very quickly. She reports she still has bright red bleeding, but it is lighter than earlier. She is also 13 weeks gestation. She denies cramping, but also has had no quickening. She is concerned that there is something wrong with the pregnancy.     Past Medical History:   Diagnosis Date    ASCUS of cervix with negative high risk HPV 12/15/2023    Asthma     Dysmenorrhea     Varicella        Past Surgical History:   Procedure Laterality Date    NO PAST SURGERIES         OB History    Para Term  AB Living   1 0 0 0 0 0   SAB IAB Ectopic Multiple Live Births   0 0 0 0 0      # Outcome Date GA Lbr Kyle/2nd Weight Sex Delivery Anes PTL Lv   1 Current               Obstetric Comments   Menarche 12             Current Outpatient Medications:     Prenatal MV-Min-Fe Fum-FA-DHA (Prenatal+DHA) 28-0.975 & 200 MG MISC, Take 1 each by mouth daily, Disp: 90 each, Rfl: 0    Allergies   Allergen Reactions    Cat Hair Extract     Dust Mite Extract     Other Allergic Rhinitis       Social History     Socioeconomic History    Marital status: Single     Spouse name: Not on file    Number of children: Not on file    Years of education: Not on file    Highest education level: Not on file   Occupational History    Not on file   Tobacco Use    Smoking status: Never    Smokeless tobacco: Never   Vaping Use    Vaping status: Never Used   Substance and Sexual Activity    Alcohol use: Not Currently     Comment: Social    Drug use: Not Currently     Types: Marijuana    Sexual activity: Yes     Partners: Male     Birth control/protection: None   Other Topics Concern    Not on  file   Social History Narrative    Uses seatbelts     Social Determinants of Health     Financial Resource Strain: Not on file   Food Insecurity: Not on file   Transportation Needs: No Transportation Needs (6/23/2020)    PRAPARE - Transportation     Lack of Transportation (Medical): No     Lack of Transportation (Non-Medical): No   Physical Activity: Not on file   Stress: Not on file   Social Connections: Not on file   Intimate Partner Violence: Not on file   Housing Stability: Not on file       Family History   Problem Relation Age of Onset    Hypertension Mother     Asthma Father     Cancer Maternal Grandmother     Heart disease Maternal Grandmother     Hypertension Maternal Grandfather     Heart disease Paternal Grandmother     Diabetes Family     Breast cancer Neg Hx     Colon cancer Neg Hx     Ovarian cancer Neg Hx     Uterine cancer Neg Hx        Review of Systems   Constitutional:  Negative for chills, fatigue, fever and unexpected weight change.   HENT:  Negative for congestion, mouth sores and sore throat.    Respiratory:  Negative for cough, chest tightness, shortness of breath and wheezing.    Cardiovascular:  Negative for chest pain and palpitations.   Gastrointestinal:  Negative for abdominal distention, abdominal pain, constipation, diarrhea, nausea and vomiting.   Endocrine: Negative for cold intolerance and heat intolerance.   Genitourinary:  Positive for vaginal bleeding. Negative for dyspareunia, dysuria, genital sores, menstrual problem, pelvic pain, vaginal discharge and vaginal pain.   Musculoskeletal:  Negative for arthralgias.   Skin:  Negative for color change and rash.   Neurological:  Negative for dizziness, light-headedness and headaches.   Hematological:  Negative for adenopathy.       Blood pressure 112/64, weight 53.5 kg (118 lb), last menstrual period 11/05/2023. and Body mass index is 21.58 kg/m².    Physical Exam  Constitutional:       General: She is not in acute distress.      Appearance: Normal appearance. She is normal weight. She is not ill-appearing.   HENT:      Head: Normocephalic and atraumatic.   Eyes:      Extraocular Movements: Extraocular movements intact.      Conjunctiva/sclera: Conjunctivae normal.   Pulmonary:      Effort: Pulmonary effort is normal.   Abdominal:      General: There is no distension.      Palpations: Abdomen is soft. There is no mass.      Tenderness: There is no abdominal tenderness. There is no guarding or rebound.      Hernia: No hernia is present.      Comments: +FHT by doppler--150   Musculoskeletal:         General: Normal range of motion.      Cervical back: Normal range of motion.   Skin:     General: Skin is warm.      Findings: No erythema or rash.   Neurological:      Mental Status: She is alert and oriented to person, place, and time.   Psychiatric:         Mood and Affect: Mood normal.         Behavior: Behavior normal.         Thought Content: Thought content normal.         Judgment: Judgment normal.          vulva: normal external genitalia for age and no lesions, masses, epithelial changes, or exudate  vagina: color pink, rugae  well formed rugae, and bleeding  with a moderate amount of bleeding  cervix: nullip and active bleeding from 12 oclock biopsy site. Cauterized with silver nitrite. Observed for 1 minute with no bleeding.       A/P:  Pt is a 29 y.o.  with      Gladys was seen today for gynecology problem.    Diagnoses and all orders for this visit:    Cervical lesion    Active bleeding from cervical biopsy site  Cauterized  +FHT by doppler    F/u for further acute bleeding otherwise routine ob visit.

## 2024-02-06 NOTE — PATIENT COMMUNICATION
"Pt called into the office stating that the bleeding started to get heavier. Pt stated that she is filling up 1 pad now, that the blood \"gushed\" and it is a brighter red now. Pt is concerned on amount of blood. Please advise.   "

## 2024-02-06 NOTE — PATIENT COMMUNICATION
Spoke with Dr. Echevarria and she recommended pt being seen, offered pt a 12pm with Dr. Echevarria and pt accepted.

## 2024-02-07 ENCOUNTER — TELEPHONE (OUTPATIENT)
Facility: HOSPITAL | Age: 29
End: 2024-02-07

## 2024-02-07 PROCEDURE — 88305 TISSUE EXAM BY PATHOLOGIST: CPT | Performed by: PATHOLOGY

## 2024-02-07 PROCEDURE — 88360 TUMOR IMMUNOHISTOCHEM/MANUAL: CPT | Performed by: PATHOLOGY

## 2024-02-07 PROCEDURE — 88342 IMHCHEM/IMCYTCHM 1ST ANTB: CPT | Performed by: PATHOLOGY

## 2024-02-07 NOTE — TELEPHONE ENCOUNTER
----- Message from Ashvin Mayo MD sent at 2/7/2024  9:19 AM EST -----  Regarding: NIPT  The NIPT result was reviewed by me.  The result reveals low risk for trisomies 21, 18, 13, and sex chromosome aneuploidies.  ----- Message -----  From: Interface, Transcription Incoming  Sent: 2/6/2024   7:33 PM EST  To: Ashvin Mayo MD

## 2024-03-05 ENCOUNTER — ROUTINE PRENATAL (OUTPATIENT)
Dept: OBGYN CLINIC | Facility: CLINIC | Age: 29
End: 2024-03-05

## 2024-03-05 VITALS
BODY MASS INDEX: 22.67 KG/M2 | WEIGHT: 123.2 LBS | DIASTOLIC BLOOD PRESSURE: 84 MMHG | HEIGHT: 62 IN | SYSTOLIC BLOOD PRESSURE: 118 MMHG | HEART RATE: 97 BPM | OXYGEN SATURATION: 100 %

## 2024-03-05 DIAGNOSIS — Z34.02 ENCOUNTER FOR SUPERVISION OF NORMAL FIRST PREGNANCY IN SECOND TRIMESTER: Primary | ICD-10-CM

## 2024-03-05 PROBLEM — Z3A.17 17 WEEKS GESTATION OF PREGNANCY: Status: ACTIVE | Noted: 2024-01-31

## 2024-03-05 PROCEDURE — PNV: Performed by: OBSTETRICS & GYNECOLOGY

## 2024-03-05 NOTE — PROGRESS NOTES
"Assessment & Plan  29 y.o.  at 17w2d presenting for routine prenatal visit.       Problem List       Chronic bilateral low back pain with bilateral sciatica    Intermittent left lower quadrant abdominal pain    Bilateral sciatica    Asthma    Dysmenorrhea    Pelvic pain in female    Liver lesion    Amenorrhea    Blood pressure elevated without history of HTN    ASCUS with positive high risk HPV cervical    Overview     From Pap smear with PCP in 2023  Recommend colposcopy         17 weeks gestation of pregnancy    Overview     Prenatal labs wnl  MSAFP ordered  Contraception [ ]  Delivery consent [ ]  GBS [ ]  Tdap [ ]          Other Visit Diagnoses       Encounter for supervision of normal first pregnancy in second trimester    -  Primary          ____________________________________________________________  Subjective  She reports some sharp shooting pain in her vagina  She denies contractions, loss of fluid, or vaginal bleeding.   She feels some flutters       Objective  /84 (BP Location: Right arm, Patient Position: Sitting, Cuff Size: Standard)   Pulse 97   Ht 5' 2\" (1.575 m)   Wt 55.9 kg (123 lb 3.2 oz)   LMP 2023 (Exact Date)   SpO2 100%   BMI 22.53 kg/m²   FHR: 140's via doppler    Physical Exam  Constitutional:       Appearance: She is well-developed.   Cardiovascular:      Rate and Rhythm: Normal rate and regular rhythm.      Heart sounds: Normal heart sounds. No murmur heard.     No friction rub. No gallop.   Pulmonary:      Effort: Pulmonary effort is normal. No respiratory distress.      Breath sounds: No wheezing.   Abdominal:      Palpations: Abdomen is soft.      Tenderness: There is no abdominal tenderness.   Musculoskeletal:         General: No tenderness.   Neurological:      Mental Status: She is alert and oriented to person, place, and time.   Vitals reviewed.          Patient's Active Problem List  Patient Active Problem List   Diagnosis    Chronic bilateral low " back pain with bilateral sciatica    Intermittent left lower quadrant abdominal pain    Bilateral sciatica    Asthma    Dysmenorrhea    Pelvic pain in female    Liver lesion    Amenorrhea    Blood pressure elevated without history of HTN    ASCUS with positive high risk HPV cervical    17 weeks gestation of pregnancy           Sarahi Delaney MD  3/5/2024  10:29 AM

## 2024-03-15 ENCOUNTER — APPOINTMENT (OUTPATIENT)
Dept: LAB | Age: 29
End: 2024-03-15
Payer: COMMERCIAL

## 2024-03-15 DIAGNOSIS — Z34.02 ENCOUNTER FOR SUPERVISION OF NORMAL FIRST PREGNANCY IN SECOND TRIMESTER: ICD-10-CM

## 2024-03-15 PROCEDURE — 82105 ALPHA-FETOPROTEIN SERUM: CPT

## 2024-03-15 PROCEDURE — 36415 COLL VENOUS BLD VENIPUNCTURE: CPT

## 2024-03-17 LAB
2ND TRIMESTER 4 SCREEN SERPL-IMP: NORMAL
AFP ADJ MOM SERPL: 1.13
AFP INTERP AMN-IMP: NORMAL
AFP INTERP SERPL-IMP: NORMAL
AFP INTERP SERPL-IMP: NORMAL
AFP SERPL-MCNC: 62.5 NG/ML
AGE AT DELIVERY: 28.5 YR
GA METHOD: NORMAL
GA: 18.7 WEEKS
IDDM PATIENT QL: NO
MULTIPLE PREGNANCY: NO
NEURAL TUBE DEFECT RISK FETUS: 8106 %

## 2024-03-29 ENCOUNTER — ROUTINE PRENATAL (OUTPATIENT)
Dept: PERINATAL CARE | Facility: OTHER | Age: 29
End: 2024-03-29
Payer: COMMERCIAL

## 2024-03-29 VITALS
BODY MASS INDEX: 24.18 KG/M2 | DIASTOLIC BLOOD PRESSURE: 66 MMHG | HEART RATE: 99 BPM | HEIGHT: 62 IN | SYSTOLIC BLOOD PRESSURE: 100 MMHG | WEIGHT: 131.4 LBS

## 2024-03-29 DIAGNOSIS — Z3A.20 20 WEEKS GESTATION OF PREGNANCY: Primary | ICD-10-CM

## 2024-03-29 PROCEDURE — 76805 OB US >/= 14 WKS SNGL FETUS: CPT | Performed by: OBSTETRICS & GYNECOLOGY

## 2024-03-29 PROCEDURE — 99213 OFFICE O/P EST LOW 20 MIN: CPT | Performed by: OBSTETRICS & GYNECOLOGY

## 2024-03-29 PROCEDURE — 76817 TRANSVAGINAL US OBSTETRIC: CPT | Performed by: OBSTETRICS & GYNECOLOGY

## 2024-03-29 NOTE — PROGRESS NOTES
A fetal ultrasound was completed. See Ob procedures in Epic for an interpretation and recommendations. Do not hesitate to contact us in [facility] if you have questions.    Sami Alexander MD, MSCE  Maternal Fetal Medicine

## 2024-03-29 NOTE — PROGRESS NOTES
Ultrasound Probe Disinfection    A transvaginal ultrasound was performed.   Prior to use, disinfection was performed with High Level Disinfection Process ([employer]).  Probe serial number [id number]: [id number] was used.      Yoli Rosa  03/29/24  10:05 AM

## 2024-03-29 NOTE — LETTER
March 29, 2024     Rima Ernst MD  [address] [address]    Patient: Gladys Church   YOB: 1995   Date of Visit: 3/29/2024       Dear Dr. Ernst:    Thank you for referring Gladys Church to me for evaluation. Below are my notes for this consultation.    If you have questions, please do not hesitate to call me. I look forward to following your patient along with you.         Sincerely,        Sami Alexander MD        CC: No Recipients    Sami Alexander MD  3/29/2024 12:38 PM  Sign when Signing Visit  A fetal ultrasound was completed. See Ob procedures in Epic for an interpretation and recommendations. Do not hesitate to contact us in [facility] if you have questions.    Sami Alexander MD, MSCE  Maternal Fetal Medicine

## 2024-04-04 PROBLEM — Z3A.21 21 WEEKS GESTATION OF PREGNANCY: Status: ACTIVE | Noted: 2024-01-31

## 2024-04-05 ENCOUNTER — TELEPHONE (OUTPATIENT)
Dept: OBGYN CLINIC | Facility: CLINIC | Age: 29
End: 2024-04-05

## 2024-04-05 NOTE — TELEPHONE ENCOUNTER
I attempted to contact the patient regarding her missed appointment today and left a message to inquire about the possibility of rescheduling.

## 2024-04-08 ENCOUNTER — ROUTINE PRENATAL (OUTPATIENT)
Dept: OBGYN CLINIC | Facility: CLINIC | Age: 29
End: 2024-04-08

## 2024-04-08 VITALS
WEIGHT: 132 LBS | HEIGHT: 62 IN | DIASTOLIC BLOOD PRESSURE: 64 MMHG | HEART RATE: 92 BPM | SYSTOLIC BLOOD PRESSURE: 100 MMHG | BODY MASS INDEX: 24.29 KG/M2

## 2024-04-08 DIAGNOSIS — Z3A.22 22 WEEKS GESTATION OF PREGNANCY: ICD-10-CM

## 2024-04-08 DIAGNOSIS — Z34.02 ENCOUNTER FOR SUPERVISION OF NORMAL FIRST PREGNANCY IN SECOND TRIMESTER: Primary | ICD-10-CM

## 2024-04-08 PROCEDURE — PNV: Performed by: OBSTETRICS & GYNECOLOGY

## 2024-04-08 NOTE — PROGRESS NOTES
Pt is a 29 y.o.  22w1d  Pregnancy is uncomplicated      Pt reports +FM. Denies vb, lof, ctx. PTL precautions reviewed    NIPS and MSAFP low risk  Anatomy scan wnl    F/U 4 weeks.

## 2024-05-06 DIAGNOSIS — Z3A.26 26 WEEKS GESTATION OF PREGNANCY: ICD-10-CM

## 2024-05-06 DIAGNOSIS — Z20.820 EXPOSURE TO VARICELLA: Primary | ICD-10-CM

## 2024-05-09 ENCOUNTER — ROUTINE PRENATAL (OUTPATIENT)
Dept: OBGYN CLINIC | Facility: CLINIC | Age: 29
End: 2024-05-09

## 2024-05-09 VITALS
SYSTOLIC BLOOD PRESSURE: 122 MMHG | HEIGHT: 62 IN | WEIGHT: 141 LBS | DIASTOLIC BLOOD PRESSURE: 68 MMHG | BODY MASS INDEX: 25.95 KG/M2

## 2024-05-09 DIAGNOSIS — Z34.02 ENCOUNTER FOR SUPERVISION OF NORMAL FIRST PREGNANCY IN SECOND TRIMESTER: Primary | ICD-10-CM

## 2024-05-09 DIAGNOSIS — R87.810 ASCUS WITH POSITIVE HIGH RISK HPV CERVICAL: ICD-10-CM

## 2024-05-09 DIAGNOSIS — Z3A.22 22 WEEKS GESTATION OF PREGNANCY: ICD-10-CM

## 2024-05-09 DIAGNOSIS — R87.610 ASCUS WITH POSITIVE HIGH RISK HPV CERVICAL: ICD-10-CM

## 2024-05-09 PROCEDURE — PNV: Performed by: NURSE PRACTITIONER

## 2024-05-09 NOTE — PATIENT INSTRUCTIONS
Kick Counts in Pregnancy   WHAT YOU NEED TO KNOW:   Kick counts measure how much your baby is moving in your womb. A kick from your baby can be felt as a twist, turn, swish, roll, or jab. It is common to feel your baby kicking at 26 to 28 weeks of pregnancy. You may feel your baby kick as early as 20 weeks of pregnancy. You may want to start counting at 28 weeks.   DISCHARGE INSTRUCTIONS:   Contact your doctor immediately if:   You feel a change in the number of kicks or movements of your baby.     You feel fewer than 10 kicks within 2 hours.     You have questions or concerns about your baby's movements.    Why measure kick counts:  Your baby's movement may provide information about your baby's health. He or she may move less, or not at all, if there are problems. Your baby may move less if he or she is not getting enough oxygen or nutrition from the placenta. Do not smoke while you are pregnant. Smoking decreases the amount of oxygen that gets to your baby. Talk to your healthcare provider if you need help to quit smoking. Tell your healthcare provider as soon as you feel a change in your baby's movements.  When to measure kick counts:   Measure kick counts at the same time every day.      Measure kick counts when your baby is awake and most active. Your baby may be most active in the evening.    How to measure kick counts:  Check that your baby is awake before you measure kick counts. You can wake up your baby by lightly pushing on your belly, walking, or drinking something cold. Your healthcare provider may tell you different ways to measure kick counts. You may be told to do the following:  Use a chart or clock to keep track of the time you start and finish counting.     Sit in a chair or lie on your left side.     Place your hands on the largest part of your belly.     Count until you reach 10 kicks. Write down how much time it takes to count 10 kicks.     It may take 30 minutes to 2 hours to count 10 kicks.  It should not take more than 2 hours to count 10 kicks.    Follow up with your doctor as directed:  Write down your questions so you remember to ask them during your visits.  © Copyright [employer]2023 Information is for End User's use only and may not be sold, redistributed or otherwise used for commercial purposes.  The above information is an  only. It is not intended as medical advice for individual conditions or treatments. Talk to your doctor, nurse or pharmacist before following any medical regimen to see if it is safe and effective for you.   Labor   WHAT YOU NEED TO KNOW:    (premature) labor occurs when the uterus contracts and your cervix opens earlier than normal. The cervix is the opening of your uterus.  labor happens after the 20th week of pregnancy but before the 37th week. You may have premature rupture of membranes (PROM). PROM means your water broke before labor began. An early labor could cause you to have your baby before he or she is ready to be born.       DISCHARGE INSTRUCTIONS:   Call your local emergency number (911 in the ) if:   You see or feel like there is something in your vagina.      Call your doctor if:   You have bright red, painless vaginal bleeding.    Your symptoms do not get better or they get worse.    Your water broke or you feel warm water gushing or trickling from your vagina.    You have contractions that get stronger and closer together for more than 1 hour.     You notice a decrease in your baby's movement.    You have abdominal cramps, pressure, or tightening.    You have a change in vaginal discharge.    You have a fever.     You have burning when you urinate or you are urinating less than is normal for you.    You have questions or concerns about your condition or care.    Medicines:   Antibiotics  may be given to treat a bacterial infection.     Take your medicine as directed.  Contact your healthcare provider if you think your  medicine is not helping or if you have side effects. Tell your provider if you are allergic to any medicine. Keep a list of the medicines, vitamins, and herbs you take. Include the amounts, and when and why you take them. Bring the list or the pill bottles to follow-up visits. Carry your medicine list with you in case of an emergency.    Self-care:   Rest  as much as possible. You may need to lie on your left side to improve circulation to the uterus and baby. You may be able to prevent  labor by resting and reducing your physical activity.    Ask your healthcare provider about activities that are safe for you to do.  Your healthcare provider or obstetrician may recommend that you avoid sexual intercourse. Ask your healthcare provider if exercise is safe.     Drink liquids as directed.  Ask how much liquid to drink each day and which liquids are best for you.     Do not smoke.  Your baby may not grow well and he or she may weigh less at birth if you smoke during pregnancy. Smoking also increases the risk that your baby will be born too early. Nicotine and other chemicals in cigarettes and cigars can cause lung damage. Ask your healthcare provider for information if you currently smoke and need help to quit. E-cigarettes or smokeless tobacco still contain nicotine. Talk to your healthcare provider before you use these products.     Do not drink alcohol.  Alcohol may harm your unborn baby and cause  labor.     Maintain a healthy weight.  A healthy weight may prevent  labor. Ask your healthcare provider how much weight you should gain during your pregnancy.    Follow up with your doctor as directed:  Write down your questions so you remember to ask them during your visits.  © Copyright [employer]2023 Information is for End User's use only and may not be sold, redistributed or otherwise used for commercial purposes.  The above information is an  only. It is not intended as medical  advice for individual conditions or treatments. Talk to your doctor, nurse or pharmacist before following any medical regimen to see if it is safe and effective for you.

## 2024-05-09 NOTE — PROGRESS NOTES
28 yo  at 26w4d gestation.  2024 Colpo w/ ALLYSON 1    Varicella titer ordered by Dr. Echevarria due to possible exposure to shingles.  Patient has not had this drawn as she states she did not have direct exposure to her co-worker.    Baby is active, denies leakage of fluid, vaginal bleeding or uterine contractions.  Some musculoskeletal discomfort in her hips.  S=D, normal FHTs, normal BP    28w lab order provided.  FKCs and PTL reviewed.  Suggested maternity support belt.  Growth scan at 35w (24)  RV 2w

## 2024-05-20 ENCOUNTER — LAB (OUTPATIENT)
Dept: LAB | Age: 29
End: 2024-05-20
Payer: COMMERCIAL

## 2024-05-20 DIAGNOSIS — Z34.02 ENCOUNTER FOR SUPERVISION OF NORMAL FIRST PREGNANCY IN SECOND TRIMESTER: ICD-10-CM

## 2024-05-20 LAB
BASOPHILS # BLD AUTO: 0.05 THOUSANDS/ÂΜL (ref 0–0.1)
BASOPHILS NFR BLD AUTO: 1 % (ref 0–1)
EOSINOPHIL # BLD AUTO: 0.26 THOUSAND/ÂΜL (ref 0–0.61)
EOSINOPHIL NFR BLD AUTO: 3 % (ref 0–6)
ERYTHROCYTE [DISTWIDTH] IN BLOOD BY AUTOMATED COUNT: 12.9 % (ref 11.6–15.1)
GLUCOSE 1H P 50 G GLC PO SERPL-MCNC: 88 MG/DL (ref 70–134)
HCT VFR BLD AUTO: 30.4 % (ref 34.8–46.1)
HGB BLD-MCNC: 10.1 G/DL (ref 11.5–15.4)
IMM GRANULOCYTES # BLD AUTO: 0.09 THOUSAND/UL (ref 0–0.2)
IMM GRANULOCYTES NFR BLD AUTO: 1 % (ref 0–2)
LYMPHOCYTES # BLD AUTO: 2 THOUSANDS/ÂΜL (ref 0.6–4.47)
LYMPHOCYTES NFR BLD AUTO: 20 % (ref 14–44)
MCH RBC QN AUTO: 28.1 PG (ref 26.8–34.3)
MCHC RBC AUTO-ENTMCNC: 33.2 G/DL (ref 31.4–37.4)
MCV RBC AUTO: 85 FL (ref 82–98)
MONOCYTES # BLD AUTO: 0.68 THOUSAND/ÂΜL (ref 0.17–1.22)
MONOCYTES NFR BLD AUTO: 7 % (ref 4–12)
NEUTROPHILS # BLD AUTO: 6.83 THOUSANDS/ÂΜL (ref 1.85–7.62)
NEUTS SEG NFR BLD AUTO: 68 % (ref 43–75)
NRBC BLD AUTO-RTO: 0 /100 WBCS
PLATELET # BLD AUTO: 295 THOUSANDS/UL (ref 149–390)
PMV BLD AUTO: 9.7 FL (ref 8.9–12.7)
RBC # BLD AUTO: 3.59 MILLION/UL (ref 3.81–5.12)
TREPONEMA PALLIDUM IGG+IGM AB [PRESENCE] IN SERUM OR PLASMA BY IMMUNOASSAY: NORMAL
WBC # BLD AUTO: 9.91 THOUSAND/UL (ref 4.31–10.16)

## 2024-05-20 PROCEDURE — 86780 TREPONEMA PALLIDUM: CPT

## 2024-05-20 PROCEDURE — 85025 COMPLETE CBC W/AUTO DIFF WBC: CPT

## 2024-05-20 PROCEDURE — 36415 COLL VENOUS BLD VENIPUNCTURE: CPT

## 2024-05-20 PROCEDURE — 82950 GLUCOSE TEST: CPT

## 2024-05-21 PROBLEM — Z3A.28 28 WEEKS GESTATION OF PREGNANCY: Status: ACTIVE | Noted: 2024-01-31

## 2024-05-21 NOTE — RESULT ENCOUNTER NOTE
28 week labs: normal glucose tolerance screen, negative syphilis.  Mild anemia of pregnancy-- recommend adding a daily iron supplement-- advise her to take on an empty stomach, at a separate time from her prenatal vitamin and with some orange juice.

## 2024-05-24 ENCOUNTER — ROUTINE PRENATAL (OUTPATIENT)
Dept: OBGYN CLINIC | Facility: CLINIC | Age: 29
End: 2024-05-24

## 2024-05-24 VITALS
HEART RATE: 104 BPM | OXYGEN SATURATION: 98 % | SYSTOLIC BLOOD PRESSURE: 118 MMHG | DIASTOLIC BLOOD PRESSURE: 76 MMHG | HEIGHT: 62 IN | BODY MASS INDEX: 25.21 KG/M2 | WEIGHT: 137 LBS

## 2024-05-24 DIAGNOSIS — Z34.02 ENCOUNTER FOR SUPERVISION OF NORMAL FIRST PREGNANCY IN SECOND TRIMESTER: Primary | ICD-10-CM

## 2024-05-24 LAB
DME PARACHUTE DELIVERY DATE REQUESTED: NORMAL
DME PARACHUTE ITEM DESCRIPTION: NORMAL
DME PARACHUTE ORDER STATUS: NORMAL
DME PARACHUTE SUPPLIER NAME: NORMAL
DME PARACHUTE SUPPLIER PHONE: NORMAL

## 2024-05-24 PROCEDURE — PNV: Performed by: OBSTETRICS & GYNECOLOGY

## 2024-05-24 NOTE — PROGRESS NOTES
"Assessment & Plan  29 y.o.  at 28w5d presenting for routine prenatal visit.     Problem List       Chronic bilateral low back pain with bilateral sciatica    Intermittent left lower quadrant abdominal pain    Bilateral sciatica    Asthma    Dysmenorrhea    Pelvic pain in female    Liver lesion    Amenorrhea    Blood pressure elevated without history of HTN    ASCUS with positive high risk HPV cervical    Overview     From Pap smear with PCP in 2023  Recommend colposcopy  2024 Colpo: ALLYSON 1         28 weeks gestation of pregnancy    Overview     Prenatal labs wnl  MSAFP low risk  Contraception [ ]  Delivery consent [ ]  GBS [ ]  Tdap [ ]  F/u repeat growth scan at 35 wks         Encounter for supervision of normal first pregnancy in second trimester     ____________________________________________________________  Subjective  She is without complaint.   She denies contractions, loss of fluid, or vaginal bleeding.   She feels regular fetal movements.     Objective  /76 (BP Location: Right arm, Patient Position: Sitting, Cuff Size: Standard)   Pulse 104   Ht 5' 2\" (1.575 m)   Wt 62.1 kg (137 lb)   LMP 2023 (Exact Date)   SpO2 98%   BMI 25.06 kg/m²   FHR: 140 bpm  Fundal height 26 cm    Physical Exam  Constitutional:       Appearance: Normal appearance.   HENT:      Head: Normocephalic and atraumatic.   Cardiovascular:      Rate and Rhythm: Normal rate.   Pulmonary:      Effort: Pulmonary effort is normal. No respiratory distress.   Abdominal:      Palpations: Abdomen is soft.      Tenderness: There is no abdominal tenderness.   Musculoskeletal:         General: Normal range of motion.   Neurological:      Mental Status: She is alert.   Skin:     General: Skin is warm and dry.          Patient's Active Problem List  Patient Active Problem List   Diagnosis    Chronic bilateral low back pain with bilateral sciatica    Intermittent left lower quadrant abdominal pain    Bilateral sciatica "    Asthma    Dysmenorrhea    Pelvic pain in female    Liver lesion    Amenorrhea    Blood pressure elevated without history of HTN    ASCUS with positive high risk HPV cervical    28 weeks gestation of pregnancy    Encounter for supervision of normal first pregnancy in second trimester           Rima Ernst MD  5/24/2024  8:52 AM

## 2024-05-27 ENCOUNTER — PATIENT MESSAGE (OUTPATIENT)
Dept: OBGYN CLINIC | Facility: CLINIC | Age: 29
End: 2024-05-27

## 2024-05-28 ENCOUNTER — NURSE TRIAGE (OUTPATIENT)
Age: 29
End: 2024-05-28

## 2024-05-28 NOTE — TELEPHONE ENCOUNTER
"Pt called in c/o pain on her tailbone, sharp shooting and throbbing pain currently, pt has a history of sciatica. Patient denies a fall. Pt states that turning onto her sides causes the pain to get worse, sitting down also. Pt states that she has put heat/ice on it relieving the pain somewhat. Pt has taken Tylenol and it has relieved the pain somewhat but pt was wondering what else she could do for the pain. Patient was asked if Physical Therapy was something she was interested in, pt denied. Pt denies leakage of fluid, bleeding, contractions. Pt has +FM    Reason for Disposition   Back pain    Answer Assessment - Initial Assessment Questions  1. ONSET: \"When did the pain begin?\"       Ongoing pain  2. LOCATION: \"Where does it hurt?\" (upper, mid or lower back)      Lower back/ sciatica pain  3. SEVERITY: \"How bad is the pain?\"  (e.g., Scale 1-10; mild, moderate, or severe)    - MILD (1-3): doesn't interfere with normal activities     - MODERATE (4-7): interferes with normal activities or awakens from sleep     - SEVERE (8-10): excruciating pain, unable to do any normal activities       Moderate   4. PATTERN: \"Is the pain constant?\" (e.g., yes, no; constant, intermittent)       Ongoing   5. RADIATION: \"Does the pain shoot into your legs or elsewhere?\"      Denies   6. CAUSE:  \"What do you think is causing the back pain?\"       Pt has history of sciatica  8. MEDICATIONS: \"What have you taken so far for the pain?\" (e.g., nothing, acetaminophen)      Pt has taken tylenol with some relief/bladder control?\"        10. OTHER SYMPTOMS: \"Do you have any other symptoms?\" (e.g., fever, abdominal pain, burning with urination, blood in urine, fluid leaking from vagina)        Pt denies leakage of fluid, bleeding, contractions, +FM  11. AMY: \"What date are you expecting to deliver?\"        8/11/24    Protocols used: Pregnancy - Back Pain-ADULT-[place]    "

## 2024-05-29 ENCOUNTER — ROUTINE PRENATAL (OUTPATIENT)
Dept: OBGYN CLINIC | Facility: CLINIC | Age: 29
End: 2024-05-29

## 2024-05-29 VITALS
BODY MASS INDEX: 25.4 KG/M2 | DIASTOLIC BLOOD PRESSURE: 74 MMHG | WEIGHT: 138 LBS | SYSTOLIC BLOOD PRESSURE: 118 MMHG | HEART RATE: 107 BPM | HEIGHT: 62 IN

## 2024-05-29 DIAGNOSIS — O26.893 LOW BACK PAIN DURING PREGNANCY IN THIRD TRIMESTER: Primary | ICD-10-CM

## 2024-05-29 DIAGNOSIS — O99.013 ANEMIA, ANTEPARTUM, THIRD TRIMESTER: ICD-10-CM

## 2024-05-29 DIAGNOSIS — Z3A.29 29 WEEKS GESTATION OF PREGNANCY: ICD-10-CM

## 2024-05-29 DIAGNOSIS — M54.50 LOW BACK PAIN DURING PREGNANCY IN THIRD TRIMESTER: Primary | ICD-10-CM

## 2024-05-29 PROCEDURE — PNV: Performed by: OBSTETRICS & GYNECOLOGY

## 2024-05-29 RX ORDER — FERROUS GLUCONATE 324(38)MG
324 TABLET ORAL
COMMUNITY

## 2024-05-29 NOTE — PROGRESS NOTES
Pt is a 29 y.o.  29w3d  Pregnancy is complicated by anemia for which she is taking iron    Pt presents for interval visit secondary to low back/tail bone pain that started on Saturday. Pt reports she she is unable to sit or lie down due to the pain. She reports some sciatica, but it is not constant. The low back pain is constant. She has tried heat, tylenol and ice with minimal relief.    Pt reports +FM. Denies vb, lof, ctx. PTL precautions and fkc reviewed    On examination pt with tenderness along lumbar spine with tense paraspinal muscles. Encouraged maternity support belt to help accommodate for lordosis of pregnancy. Continue tylenol and heat. Will relieve her of remainder of work duties this week as she is obviously very uncomfortable during the visit. She is unable to sit and is leaning right and unable to lie flat during examination.     Will also refer to PT In pregnancy.    F/u for routine ob visit.

## 2024-05-30 ENCOUNTER — EVALUATION (OUTPATIENT)
Dept: PHYSICAL THERAPY | Facility: REHABILITATION | Age: 29
End: 2024-05-30
Payer: COMMERCIAL

## 2024-05-30 DIAGNOSIS — M54.50 LOW BACK PAIN DURING PREGNANCY IN THIRD TRIMESTER: Primary | ICD-10-CM

## 2024-05-30 DIAGNOSIS — O26.893 LOW BACK PAIN DURING PREGNANCY IN THIRD TRIMESTER: Primary | ICD-10-CM

## 2024-05-30 PROCEDURE — 97161 PT EVAL LOW COMPLEX 20 MIN: CPT | Performed by: PHYSICAL THERAPIST

## 2024-05-30 PROCEDURE — 97110 THERAPEUTIC EXERCISES: CPT | Performed by: PHYSICAL THERAPIST

## 2024-05-30 NOTE — PROGRESS NOTES
PT Evaluation     Today's date: 2024  Patient name: Gladys Church  : 1995  MRN: [id number]  Referring provider: Beth Echevarria MD  Dx:   Encounter Diagnosis     ICD-10-CM    1. Low back pain during pregnancy in third trimester  O26.893 Ambulatory Referral to Physical Therapy    M54.50                      Assessment  Impairments: abnormal coordination, abnormal or restricted ROM, activity intolerance, impaired physical strength and pain with function    Assessment details: Pt is a pleasant 29 y.o. female presenting to outpatient physical therapy with Low back pain during pregnancy in third trimester  (primary encounter diagnosis).     Pt presents with pain, decreased range of motion, decreased strength, and decreased tolerance to activity. Issued patient HEP.    Pt is a good candidate for outpatient physical therapy and would benefit from skilled physical therapy to address limitations and to achieve goals. Thank you for this referral.   Understanding of Dx/Px/POC: good     Prognosis: good    Goals  Short-Term Goals (4 weeks)   1. Patient will decrease worst rating of pain by 25% to improve quality of life.  2. Patient will increase strength by 1/2 MMT to improve quality of life with improved efficiency of daily activities.  3. Patient will improve ROM by 25% indicating improved mobility of affected area.    Long-Term Goals (8 weeks)   1. Patient will decrease pain by 50% at worst in comparison to IE indicating significant reduction in pain and improved quality of life.  2. Patient will demonstrate strength WFL compared to IE levels indicating ability to independently manage pain symptoms to accomplish daily activities.   3. Patient will be independent with HEP with good form accomplished.      Plan  Patient would benefit from: PT eval and skilled PT  Planned modality interventions: cryotherapy and thermotherapy: hydrocollator packs    Planned therapy interventions: IADL retraining, body mechanics  training, flexibility, functional ROM exercises, home exercise program, neuromuscular re-education, manual therapy, postural training, strengthening, stretching, therapeutic activities, therapeutic exercise and joint mobilization    Frequency: 1x week  Duration in visits: 20  Duration in weeks: 4  Treatment plan discussed with: patient        Subjective Evaluation    History of Present Illness  Mechanism of injury: 24  Pt comes to therapy reporting recent onset of low back pain. Notes she is 29-weeks pregnant, stating her OB explained her pain may be coming from her ligaments beginning to relax in prep for delivery.     Notes most movements and positions are uncomfortable for her, with pain noted sitting, standing, walking, and with transfers. Also reports pain and difficulty with sleeping, despite using a pregnancy pillow. Reports her pain is located in area of left lateral boarder of sacrum, described as sharp, stabbing pain.     Notes she has tried using heat and ice, with minimal effect. Also notes she takes Tylenol, with temporary relief. Notes she has been wearing a belly band as well, as recommended by her physician.  Patient Goals  Patient goals for therapy: decreased pain    Pain  Current pain ratin  At worst pain ratin          Objective     Active Range of Motion   Cervical/Thoracic Spine       Thoracic    Left lateral flexion:  Restriction level: moderate  Right lateral flexion:  Restriction level: moderate  Left rotation:  Restriction level: moderate  Right rotation:  with pain Restriction level: moderate    Lumbar   Flexion:  Restriction level: minimal  Extension:  with pain Restriction level: moderate  Left lateral flexion:  Restriction level: moderate  Right lateral flexion:  Restriction level: moderate  Left rotation:  Restriction level: minimal  Right rotation:  Restriction level: minimal    Strength/Myotome Testing     Left Hip   Planes of Motion   Flexion: 4  Abduction:  "4  Adduction: 4    Right Hip   Planes of Motion   Flexion: 4  Abduction: 4  Adduction: 4    Left Knee   Flexion: 4+  Extension: 4    Right Knee   Flexion: 4+  Extension: 4    Tests     Lumbar   Negative sacral spring .     Additional Tests Details  05/30/24  TTP along left lateral sacral boarder/piriformis              Precautions: pregnant (29 weeks as of IE 5/30)    Date 5/30            FOTO nv            Re-eval IE                         Manuals    STM L piriformis                                                    Neuro Re-Ed                                                                                               Ther Ex    Piriformis stretch seated 20\"x5 B            Seated hip add iso 5\"x10            Lumbar pball roll outs 10\"x5            Seated HS stretch             S/L QL stretch 20\"x5                                                   Ther Activity                              Gait Training                              Modalities                                Access Code: [email or web address]  URL: [email or web address]  Date: 05/30/2024  Prepared by: Randy Resendez    Exercises  - Sidelying Quadratus Lumborum Stretch on Table  - 2 x daily - 5 reps - 20 hold  - Seated Piriformis Stretch  - 2 x daily - 5 reps - 20 hold  - Seated Piriformis Stretch  - 2 x daily - 5 reps - 20 hold  - Seated Thoracic Flexion and Rotation with Swiss Ball  - 2 x daily - 5 reps - 20 hold  - Seated Hip Adduction Isometrics with Ball  - 2 x daily - 2 sets - 10 reps - 5 hold         "

## 2024-06-03 ENCOUNTER — ROUTINE PRENATAL (OUTPATIENT)
Dept: OBGYN CLINIC | Facility: CLINIC | Age: 29
End: 2024-06-03

## 2024-06-03 ENCOUNTER — HOSPITAL ENCOUNTER (EMERGENCY)
Facility: HOSPITAL | Age: 29
Discharge: HOME | End: 2024-06-03
Attending: EMERGENCY MEDICINE
Payer: COMMERCIAL

## 2024-06-03 ENCOUNTER — NURSE TRIAGE (OUTPATIENT)
Age: 29
End: 2024-06-03

## 2024-06-03 VITALS
HEIGHT: 62 IN | HEART RATE: 93 BPM | BODY MASS INDEX: 25.4 KG/M2 | WEIGHT: 138 LBS | SYSTOLIC BLOOD PRESSURE: 104 MMHG | DIASTOLIC BLOOD PRESSURE: 62 MMHG

## 2024-06-03 VITALS
HEART RATE: 99 BPM | RESPIRATION RATE: 17 BRPM | DIASTOLIC BLOOD PRESSURE: 57 MMHG | OXYGEN SATURATION: 98 % | SYSTOLIC BLOOD PRESSURE: 98 MMHG | WEIGHT: 138.45 LBS | BODY MASS INDEX: 25.32 KG/M2 | TEMPERATURE: 98.7 F

## 2024-06-03 DIAGNOSIS — L02.31 ABSCESS OF GLUTEAL CLEFT: Primary | ICD-10-CM

## 2024-06-03 DIAGNOSIS — L05.01 PILONIDAL CYST WITH ABSCESS: ICD-10-CM

## 2024-06-03 DIAGNOSIS — O99.013 ANEMIA, ANTEPARTUM, THIRD TRIMESTER: ICD-10-CM

## 2024-06-03 DIAGNOSIS — Z3A.30 30 WEEKS GESTATION OF PREGNANCY: ICD-10-CM

## 2024-06-03 PROCEDURE — 99283 EMERGENCY DEPT VISIT LOW MDM: CPT

## 2024-06-03 PROCEDURE — 10080 I&D PILONIDAL CYST SIMPLE: CPT | Performed by: EMERGENCY MEDICINE

## 2024-06-03 PROCEDURE — 87070 CULTURE OTHR SPECIMN AEROBIC: CPT

## 2024-06-03 PROCEDURE — 87205 SMEAR GRAM STAIN: CPT

## 2024-06-03 PROCEDURE — 99284 EMERGENCY DEPT VISIT MOD MDM: CPT | Performed by: EMERGENCY MEDICINE

## 2024-06-03 PROCEDURE — PNV: Performed by: OBSTETRICS & GYNECOLOGY

## 2024-06-03 PROCEDURE — 87147 CULTURE TYPE IMMUNOLOGIC: CPT

## 2024-06-03 RX ORDER — LIDOCAINE HYDROCHLORIDE 10 MG/ML
5 INJECTION, SOLUTION EPIDURAL; INFILTRATION; INTRACAUDAL; PERINEURAL ONCE
Status: COMPLETED | OUTPATIENT
Start: 2024-06-03 | End: 2024-06-03

## 2024-06-03 RX ORDER — AMOXICILLIN AND CLAVULANATE POTASSIUM 875; 125 MG/1; MG/1
1 TABLET, FILM COATED ORAL EVERY 12 HOURS
Qty: 14 TABLET | Refills: 0 | Status: SHIPPED | OUTPATIENT
Start: 2024-06-03 | End: 2024-06-10

## 2024-06-03 RX ORDER — ACETAMINOPHEN 325 MG/1
650 TABLET ORAL ONCE
Status: COMPLETED | OUTPATIENT
Start: 2024-06-03 | End: 2024-06-03

## 2024-06-03 RX ORDER — AMOXICILLIN AND CLAVULANATE POTASSIUM 875; 125 MG/1; MG/1
1 TABLET, FILM COATED ORAL EVERY 12 HOURS SCHEDULED
Qty: 14 TABLET | Refills: 0 | Status: SHIPPED | OUTPATIENT
Start: 2024-06-03 | End: 2024-06-03

## 2024-06-03 RX ADMIN — ACETAMINOPHEN 325MG 650 MG: 325 TABLET ORAL at 17:18

## 2024-06-03 RX ADMIN — LIDOCAINE HYDROCHLORIDE 5 ML: 10 INJECTION, SOLUTION EPIDURAL; INFILTRATION; INTRACAUDAL at 16:21

## 2024-06-03 NOTE — TELEPHONE ENCOUNTER
Regarding: pregnat pt and having tail bone pain  ----- Message from Alva TINEO sent at 6/3/2024  7:52 AM EDT -----  Patient was  in last week for tail bone pain and taking meds and feels getting worse and cannot walk or sleep and supposed go back to work this week and does not think she can work and would like to be seen today.  Please call her back at [phone]. Thank you

## 2024-06-03 NOTE — PROGRESS NOTES
"Pt is a 29 y.o.  30w1d  Pregnancy is complicated by anemia and low back/coccygeal pain. Pt was last seen on 2024 for the same and was referred to PT. Pt presents today with worsening symptoms.     She reports 3 days after she saw me, she developed ecchymosis in the area and two days ago she developed \"pimples in between my cheeks\". She denies any drainage, fevers or chills, but worsening pain. She cannot sit on the area or stand for prolonged periods of time. SHe has been primarily lying on her side    On examination, pt noted to have ecchymosis on left gluteus near her gluteal cleft extending to the cleft. Induration and fluid filled area noted. No spontaneous drainage with pressure noted.    Advised patient she appears to at least have an abscess, but could also have a pilonidal cyst in the area and she should see a general surgeon ASAP. Referral placed for the same.     She has no obstetrical complaints.  Pt reports +FM. Denies vb, lof, ctx. PTL precautions  and fkc reviewed        "

## 2024-06-03 NOTE — TELEPHONE ENCOUNTER
Spoke with Sveta at PT. She will contact patient when discharged from hospital to coordinate sooner appointment if needed

## 2024-06-03 NOTE — TELEPHONE ENCOUNTER
"Gladys called requesting re-evaluation with DR. Echevarria for tail bone pain that is getting worse.     PT evaluation has been completed. First session scheduled 6/10/2024.  Encouraged Gladys to contact PT and request appointment this week.      Provided f/u appt with Dr Echevarria per patient request.   Reason for Disposition   Patient wants to be seen    Answer Assessment - Initial Assessment Questions  1. ONSET: \"When did the pain begin?\"       Last week- previously evaluated by DR. Echevarria  2. LOCATION: \"Where does it hurt?\" (upper, mid or lower back)      Tail bone  3. SEVERITY: \"How bad is the pain?\"  (e.g., Scale 1-10; mild, moderate, or severe)    - MILD (1-3): doesn't interfere with normal activities     - MODERATE (4-7): interferes with normal activities or awakens from sleep     - SEVERE (8-10): excruciating pain, unable to do any normal activities       7/8 today, last night pain level of 9  4. PATTERN: \"Is the pain constant?\" (e.g., yes, no; constant, intermittent)       constant  5. RADIATION: \"Does the pain shoot into your legs or elsewhere?\"      Moving to the left side  6. CAUSE:  \"What do you think is causing the back pain?\"       pregnancy  7. BACK OVERUSE:  \"Any recent lifting of heavy objects, strenuous work or exercise?\"      N/a  8. MEDICATIONS: \"What have you taken so far for the pain?\" (e.g., nothing, acetaminophen)      Tylenol, icy hot  9. NEUROLOGIC SYMPTOMS: \"Do you have any weakness, numbness, or problems with bowel/bladder control?\"      Denies-  10. OTHER SYMPTOMS: \"Do you have any other symptoms?\" (e.g., fever, abdominal pain, burning with urination, blood in urine, fluid leaking from vagina)        Stabbing, sharp pain  11. AMY: \"What date are you expecting to deliver?\"        08/11/2024    Protocols used: Pregnancy - Back Pain-ADULT-[place]    "

## 2024-06-03 NOTE — ED PROVIDER NOTES
History  Chief Complaint   Patient presents with    Cyst     Pt reports tailbone pain for approx 1 wk, states she has a cyst. Painful to sit      Patient is a 30 yo  female at 30w1d who presents for evaluation of pilonidal cyst. Patient states that she first noticed a pimple within her gluteal cleft about a week ago. Over the course of the past several days, area has grown in size and has become painful to sit on. She states the area has felt warm and hard. Has not noticed any discharge from the area. Contacted her OBGYN office today due to concern, who referred her to general surgery for further evaluation. Denies any fever, chills, nausea, vomiting, diarrhea, constipation, blood in stool, pain with defecation, dysuria, or hematuria. Patient denies any pregnancy related complaints, including abdominal pain, leakage of fluid, vaginal bleeding, or decreased fetal movement. Receives routine prenatal care and pregnancy has been uncomplicated thus far.         Prior to Admission Medications   Prescriptions Last Dose Informant Patient Reported? Taking?   Prenatal MV-Min-Fe Fum-FA-DHA (Prenatal+DHA) 28-0.975 & 200 MG MISC   No No   Sig: Take 1 each by mouth daily   ferrous gluconate (FERGON) 324 mg tablet  Self Yes No   Sig: Take 324 mg by mouth daily with breakfast      Facility-Administered Medications: None       Past Medical History:   Diagnosis Date    ASCUS of cervix with negative high risk HPV 12/15/2023    Asthma     Dysmenorrhea     Varicella        Past Surgical History:   Procedure Laterality Date    NO PAST SURGERIES         Family History   Problem Relation Age of Onset    Hypertension Mother     Asthma Father     Cancer Maternal Grandmother     Heart disease Maternal Grandmother     Hypertension Maternal Grandfather     Heart disease Paternal Grandmother     Diabetes Family     Breast cancer Neg Hx     Colon cancer Neg Hx     Ovarian cancer Neg Hx     Uterine cancer Neg Hx      I have reviewed and agree  with the history as documented.    E-Cigarette/Vaping    E-Cigarette Use Never User      E-Cigarette/Vaping Substances    Nicotine No     THC No     CBD No     Flavoring No     Other No     Unknown No      Social History     Tobacco Use    Smoking status: Never    Smokeless tobacco: Never   Vaping Use    Vaping status: Never Used   Substance Use Topics    Alcohol use: Not Currently     Comment: Social    Drug use: Not Currently     Types: Marijuana        Review of Systems   All other systems reviewed and are negative.      Physical Exam  ED Triage Vitals   Temperature Pulse Respirations Blood Pressure SpO2   06/03/24 1529 06/03/24 1529 06/03/24 1529 06/03/24 1529 06/03/24 1529   98.7 °F (37.1 °C) (!) 112 18 116/59 100 %      Temp Source Heart Rate Source Patient Position - Orthostatic VS BP Location FiO2 (%)   06/03/24 1529 06/03/24 1529 06/03/24 1529 06/03/24 1529 --   Oral Monitor Standing Right arm       Pain Score       06/03/24 1718       6             Orthostatic Vital Signs  Vitals:    06/03/24 1529 06/03/24 1719   BP: 116/59 98/57   Pulse: (!) 112 99   Patient Position - Orthostatic VS: Standing Standing       Physical Exam  Constitutional:       General: She is not in acute distress.     Appearance: Normal appearance. She is not ill-appearing, toxic-appearing or diaphoretic.   HENT:      Head: Normocephalic and atraumatic.      Mouth/Throat:      Mouth: Mucous membranes are moist.   Eyes:      Extraocular Movements: Extraocular movements intact.      Conjunctiva/sclera: Conjunctivae normal.   Cardiovascular:      Rate and Rhythm: Normal rate.   Pulmonary:      Effort: Pulmonary effort is normal.   Abdominal:      Comments: Gravid      Musculoskeletal:      Cervical back: Normal range of motion and neck supple.   Skin:     General: Skin is warm and dry.      Capillary Refill: Capillary refill takes less than 2 seconds.      Comments: 1cm area of fluctuance within superior gluteal cleft, no active drainage.  Consistent with pilonidal cyst    No active drainage  Area is warm and tender, no surrounding area of erythema      Neurological:      General: No focal deficit present.      Mental Status: She is alert.   Psychiatric:         Mood and Affect: Mood normal.         Behavior: Behavior normal.         ED Medications  Medications   lidocaine (PF) (XYLOCAINE-MPF) 1 % injection 5 mL (5 mL Infiltration Given by Other 6/3/24 1621)   acetaminophen (TYLENOL) tablet 650 mg (650 mg Oral Given 6/3/24 1718)       Diagnostic Studies  Results Reviewed       Procedure Component Value Units Date/Time    Wound culture and Gram stain [[id number]] Collected: 06/03/24 1700    Lab Status: In process Specimen: Wound from Sacrum Updated: 06/03/24 1709                   No orders to display         Procedures  Incision and drain    Date/Time: 6/3/2024 5:30 PM    Performed by: Maddi Linton MD  Authorized by: Maddi Linton MD  Universal Protocol:  Consent: Verbal consent obtained.  Consent given by: patient    Patient location:  ED  Location:     Type:  Pilonidal cyst    Size:  1 cm    Location:  Anogenital    Anogenital location:  Pilonidal  Anesthesia (see MAR for exact dosages):     Anesthesia method:  Local infiltration    Local anesthetic:  Lidocaine 1% w/o epi  Procedure details:     Complexity:  Simple    Needle aspiration: no      Incision types:  Stab incision    Scalpel blade:  11    Incision depth:  Subcutaneous    Wound management:  Probed and deloculated and irrigated with saline    Drainage:  Purulent    Drainage amount:  Copious    Wound treatment:  Wound left open    Packing materials:  1/4 in iodoform gauze  Post-procedure details:     Patient tolerance of procedure:  Tolerated well, no immediate complications        ED Course                             SBIRT 20yo+      Flowsheet Row Most Recent Value   Initial Alcohol Screen: US AUDIT-C     1. How often do you have a drink containing alcohol? 0 Filed at: 06/03/2024  1528   2. How many drinks containing alcohol do you have on a typical day you are drinking?  0 Filed at: 06/03/2024 1528   3b. FEMALE Any Age, or MALE 65+: How often do you have 4 or more drinks on one occassion? 0 Filed at: 06/03/2024 1528   Audit-C Score 0 Filed at: 06/03/2024 1528   NADER: How many times in the past year have you...    Used an illegal drug or used a prescription medication for non-medical reasons? Never Filed at: 06/03/2024 1528                  Medical Decision Making  Gladys Church is a 29 y.o. who presents for evaluation of pilonidal cyst.     Vital signs are stable, afebrile    Ddx: exam consistent pilonidal cyst, without surrounding cellulitis     Plan: I&D performed  Wound culture sent due to copious purulent discharge  Patient provided 7 day course of augmentin   Referral provided to general surgery.   Recommend follow up with PCP within the next several days for gauze removal if she is unable to be seen by general surgery within that time  Supportive care instructions provided    Disposition: Patient stable for discharge. Return precautions provided. Patient understands and is agreeable to plan.         Amount and/or Complexity of Data Reviewed  Labs: ordered.    Risk  OTC drugs.  Prescription drug management.          Disposition  Final diagnoses:   Pilonidal cyst with abscess     Time reflects when diagnosis was documented in both MDM as applicable and the Disposition within this note       Time User Action Codes Description Comment    6/3/2024  4:53 PM Maddi Linton Add [L05.01] Pilonidal cyst with abscess     6/3/2024  4:54 PM Maddi Linton Modify [L05.01] Pilonidal cyst with abscess           ED Disposition       ED Disposition   Discharge    Condition   Stable    Date/Time   Mon Gil 3, 2024 1653    Comment   Gladys Church discharge to home/self care.                   Follow-up Information       Follow up With Specialties Details Why Contact Info Additional Information     [facility] Emergency Department Emergency Medicine Go in 3 days for removal of packing from abscess incision site [address] [facility] [address] [phone] [facility] Emergency Department, [address], [place], [address]    Ann Griffin DO Family Medicine   [address] [place] [id number] [phone]               Discharge Medication List as of 6/3/2024  5:07 PM        START taking these medications    Details   amoxicillin-clavulanate (AUGMENTIN) 875-125 mg per tablet Take 1 tablet by mouth every 12 (twelve) hours for 7 days, Starting Mon 6/3/2024, Until Mon 6/10/2024, Normal           CONTINUE these medications which have NOT CHANGED    Details   ferrous gluconate (FERGON) 324 mg tablet Take 324 mg by mouth daily with breakfast, Historical Med      Prenatal MV-Min-Fe Fum-FA-DHA (Prenatal+DHA) 28-0.975 & 200 MG MISC Take 1 each by mouth daily, Starting Fri 12/15/2023, Normal               PDMP Review       None             ED Provider  Attending physically available and evaluated Gladys Church. I managed the patient along with the ED Attending.    Electronically Signed by           Maddi Linton MD  06/03/24 2379

## 2024-06-05 PROBLEM — L05.91 PILONIDAL CYST: Status: ACTIVE | Noted: 2024-06-05

## 2024-06-06 ENCOUNTER — CONSULT (OUTPATIENT)
Dept: SURGERY | Facility: CLINIC | Age: 29
End: 2024-06-06
Payer: COMMERCIAL

## 2024-06-06 VITALS
TEMPERATURE: 96.8 F | RESPIRATION RATE: 16 BRPM | HEART RATE: 105 BPM | WEIGHT: 139 LBS | SYSTOLIC BLOOD PRESSURE: 112 MMHG | OXYGEN SATURATION: 98 % | DIASTOLIC BLOOD PRESSURE: 64 MMHG | BODY MASS INDEX: 25.58 KG/M2 | HEIGHT: 62 IN

## 2024-06-06 DIAGNOSIS — L02.31 ABSCESS OF GLUTEAL CLEFT: ICD-10-CM

## 2024-06-06 PROCEDURE — 99203 OFFICE O/P NEW LOW 30 MIN: CPT | Performed by: PHYSICIAN ASSISTANT

## 2024-06-06 NOTE — PROGRESS NOTES
Assessment/Plan:   Gladys Church is a 29 y.o.female who is here for   Chief Complaint   Patient presents with    Pilonidal Cyst     Patient is here today following a ER visit 06/3/24 for a Pilonidal Cyst that was I&D states it is still draining and she still has it packed she was also placed on and is still taking   Augmentin          On exam found to have I and D of pilonidal abscess.    Plan: Packing was changed in the office today to one piece of 1/2 plain packing. She will remove this on Saturday. Return Monday for wound check. We did speak about surgical removal as patient is 30 weeks pregnant and first pilonidal cyst we will not move forward with surgery at this time. Wound care supplies given        _______________________________________________________  CC:Pilonidal Cyst (Patient is here today following a ER visit 06/3/24 for a Pilonidal Cyst that was I&D states it is still draining and she still has it packed she was also placed on and is still taking   Augmentin )  .    HPI:  Gladys Church is a 29 y.o.female who was referred for evaluation of Pilonidal Cyst (Patient is here today following a ER visit 06/3/24 for a Pilonidal Cyst that was I&D states it is still draining and she still has it packed she was also placed on and is still taking   Augmentin )  .    Currently patient reports went to the ER 6/3/24 for a pilonidal abscess. They did I and D this with packing. They also placed her on Augmentin.  Reports: much improvement of pain. Discharge into packing daily and still has packing in as of today. Denies nausea, vomiting, fevers. Patient is 30 weeks pregnant.      ROS:  General ROS: negative  negative for - chills, fatigue, fever or night sweats, weight loss  Respiratory ROS: no cough, shortness of breath, or wheezing  Cardiovascular ROS: no chest pain or dyspnea on exertion  Genito-Urinary ROS: no dysuria, trouble voiding, or hematuria  Musculoskeletal ROS: negative for - gait disturbance, joint  pain or muscle pain  Neurological ROS: no TIA or stroke symptoms  Skin ROS: See HPI  GI ROS: see HPI  Skin ROS: no new rashes or lesions   Lymphatic ROS: no new adenopathy noted by pt.   Psy ROS: no new mental or behavioral disturbances       Patient Active Problem List   Diagnosis    Chronic bilateral low back pain with bilateral sciatica    Intermittent left lower quadrant abdominal pain    Bilateral sciatica    Asthma    Dysmenorrhea    Pelvic pain in female    Liver lesion    Amenorrhea    Blood pressure elevated without history of HTN    ASCUS with positive high risk HPV cervical    30 weeks gestation of pregnancy    Encounter for supervision of normal first pregnancy in second trimester    Anemia, antepartum, third trimester    Pilonidal cyst         Allergies:  Cat hair extract, Dust mite extract, and Other      Current Outpatient Medications:     amoxicillin-clavulanate (AUGMENTIN) 875-125 mg per tablet, Take 1 tablet by mouth every 12 (twelve) hours for 7 days, Disp: 14 tablet, Rfl: 0    ferrous gluconate (FERGON) 324 mg tablet, Take 324 mg by mouth daily with breakfast, Disp: , Rfl:     Prenatal MV-Min-Fe Fum-FA-DHA (Prenatal+DHA) 28-0.975 & 200 MG MISC, Take 1 each by mouth daily, Disp: 90 each, Rfl: 0    Past Medical History:   Diagnosis Date    ASCUS of cervix with negative high risk HPV 12/15/2023    Asthma     Dysmenorrhea     Varicella        Past Surgical History:   Procedure Laterality Date    NO PAST SURGERIES         Family History   Problem Relation Age of Onset    Hypertension Mother     Asthma Father     Cancer Maternal Grandmother     Heart disease Maternal Grandmother     Hypertension Maternal Grandfather     Heart disease Paternal Grandmother     Diabetes Family     Breast cancer Neg Hx     Colon cancer Neg Hx     Ovarian cancer Neg Hx     Uterine cancer Neg Hx         reports that she has never smoked. She has never been exposed to tobacco smoke. She has never used smokeless tobacco. She  reports that she does not currently use alcohol. She reports that she does not currently use drugs after having used the following drugs: Marijuana.    Vitals:    24 1029   BP: 112/64   Pulse: 105   Resp: 16   Temp: (!) 96.8 °F (36 °C)   SpO2: 98%        PHYSICAL EXAM  General Appearance:    Alert, cooperative, no distress,    Head:    Normocephalic without obvious abnormality   Eyes:    PERRL, conjunctiva/corneas clear     Neck:   Supple, no adenopathy, no JVD   Back:      Lungs:      Heart:     Abdomen:      Extremities:   Extremities normal. No clubbing, cyanosis or edema   Psych:   Normal Affect, AOx3.    Neurologic:  Skin:   CNII-XII intact. Strength symmetric, speech intact    Warm, dry, intact, no visible rashes or lesions except as follows: 1 cm opening of superior vianney cleft. Packing removed and probed. One piece of 1/2 inch plain packing placed and covered with ABD and tape.                 Xiomara Abrams PA-C    Date: 2024 Time: 10:46 AM

## 2024-06-07 ENCOUNTER — TELEPHONE (OUTPATIENT)
Dept: OBGYN CLINIC | Facility: CLINIC | Age: 29
End: 2024-06-07

## 2024-06-07 ENCOUNTER — ROUTINE PRENATAL (OUTPATIENT)
Dept: OBGYN CLINIC | Facility: CLINIC | Age: 29
End: 2024-06-07

## 2024-06-07 VITALS
BODY MASS INDEX: 25.62 KG/M2 | SYSTOLIC BLOOD PRESSURE: 98 MMHG | HEART RATE: 108 BPM | WEIGHT: 139.2 LBS | DIASTOLIC BLOOD PRESSURE: 64 MMHG | OXYGEN SATURATION: 98 % | HEIGHT: 62 IN

## 2024-06-07 DIAGNOSIS — L05.91 PILONIDAL CYST: ICD-10-CM

## 2024-06-07 DIAGNOSIS — O99.013 ANEMIA, ANTEPARTUM, THIRD TRIMESTER: ICD-10-CM

## 2024-06-07 DIAGNOSIS — O09.93 ENCOUNTER FOR SUPERVISION OF HIGH RISK PREGNANCY IN THIRD TRIMESTER, ANTEPARTUM: Primary | ICD-10-CM

## 2024-06-07 DIAGNOSIS — Z3A.30 30 WEEKS GESTATION OF PREGNANCY: ICD-10-CM

## 2024-06-07 PROBLEM — R10.2 PELVIC PAIN IN FEMALE: Status: RESOLVED | Noted: 2020-08-06 | Resolved: 2024-06-07

## 2024-06-07 PROBLEM — R10.32 INTERMITTENT LEFT LOWER QUADRANT ABDOMINAL PAIN: Status: RESOLVED | Noted: 2018-12-06 | Resolved: 2024-06-07

## 2024-06-07 PROBLEM — Z34.02 ENCOUNTER FOR SUPERVISION OF NORMAL FIRST PREGNANCY IN SECOND TRIMESTER: Status: RESOLVED | Noted: 2024-04-08 | Resolved: 2024-06-07

## 2024-06-07 PROBLEM — N91.2 AMENORRHEA: Status: RESOLVED | Noted: 2023-12-15 | Resolved: 2024-06-07

## 2024-06-07 PROBLEM — M54.32 BILATERAL SCIATICA: Status: RESOLVED | Noted: 2019-01-17 | Resolved: 2024-06-07

## 2024-06-07 PROBLEM — M54.31 BILATERAL SCIATICA: Status: RESOLVED | Noted: 2019-01-17 | Resolved: 2024-06-07

## 2024-06-07 LAB
BACTERIA WND AEROBE CULT: ABNORMAL
DME PARACHUTE DELIVERY DATE REQUESTED: NORMAL
DME PARACHUTE ITEM DESCRIPTION: NORMAL
DME PARACHUTE ORDER STATUS: NORMAL
DME PARACHUTE SUPPLIER NAME: NORMAL
DME PARACHUTE SUPPLIER PHONE: NORMAL
GRAM STN SPEC: ABNORMAL

## 2024-06-07 PROCEDURE — PNV: Performed by: OBSTETRICS & GYNECOLOGY

## 2024-06-07 NOTE — PROGRESS NOTES
"Assessment & Plan  29 y.o.  at 30w5d presenting for routine prenatal visit.     Problem List       Chronic bilateral low back pain with bilateral sciatica    Asthma    Liver lesion    Blood pressure elevated without history of HTN    ASCUS with positive high risk HPV cervical    Overview     From Pap smear with PCP in 2023  Recommend colposcopy  2024 Colpo: ALLYSON 1         30 weeks gestation of pregnancy    Overview     Prenatal labs wnl  MSAFP low risk  Contraception [ ]  Delivery consent [ ]  GBS [ ]  Tdap [ ]  F/u repeat growth scan at 35 wks         Anemia, antepartum, third trimester    Overview     Hemoglobin 10.1 on   PO iron prescribed  Repeat CBC 4 weeks         Pilonidal cyst    Overview     Drained in ER 6/3, Augmentin prescribed  Evaluated by General Surgery 24, no surgery at this time  Has follow up on Monday          Other Visit Diagnoses       Encounter for supervision of high risk pregnancy in third trimester, antepartum    -  Primary          ____________________________________________________________  Subjective  She reports that her pain from her pilonidal cyst is greatly improved.  She denies contractions, loss of fluid, or vaginal bleeding.   She feels regular fetal movements.     Objective  BP 98/64 (BP Location: Right arm, Patient Position: Sitting, Cuff Size: Standard)   Pulse (!) 108   Ht 5' 2\" (1.575 m)   Wt 63.1 kg (139 lb 3.2 oz)   LMP 2023 (Exact Date)   SpO2 98%   BMI 25.46 kg/m²   FHR: 150 bpm  Fundal height 28 cm    Physical Exam  Constitutional:       Appearance: Normal appearance.   HENT:      Head: Normocephalic and atraumatic.   Cardiovascular:      Rate and Rhythm: Normal rate.   Pulmonary:      Effort: Pulmonary effort is normal. No respiratory distress.   Abdominal:      Palpations: Abdomen is soft.      Tenderness: There is no abdominal tenderness.   Musculoskeletal:         General: Normal range of motion.   Neurological:      Mental " Status: She is alert.   Skin:     General: Skin is warm and dry.          Patient's Active Problem List  Patient Active Problem List   Diagnosis    Chronic bilateral low back pain with bilateral sciatica    Asthma    Liver lesion    Blood pressure elevated without history of HTN    ASCUS with positive high risk HPV cervical    30 weeks gestation of pregnancy    Anemia, antepartum, third trimester    Pilonidal cyst           Rima Ernst MD  6/7/2024  10:08 AM

## 2024-06-07 NOTE — TELEPHONE ENCOUNTER
Overall how are you feeling? Patient states she is doing well.    Compliant with routine OB appointments? Yes  Have you completed your 3rd trimester lab work? Yes-does have an active order for Varicella.  Patient is aware.    Have you reviewed the contents of 3rd trimester folder from office?  No, patient will receive folder at next prenatal appointment and contents will be reviewed at that time.   Have you decided on a pediatrician? No-list provided to patient today to review.     If yes, who:      If no, what are you looking for and request sent for outreach.   Questions on paperwork to go back to office? Patient will receive folder and forms at next prenatal appointment.     Questions on the baby birth certificate forms? Patient will receive folder and forms at next prenatal appointment.  Did review information that will be provided and video link was sent 5/23/2024.    EPDS Score:   7     Sent link for the Hospital Readiness Video via [employer]

## 2024-06-10 ENCOUNTER — OFFICE VISIT (OUTPATIENT)
Dept: SURGERY | Facility: CLINIC | Age: 29
End: 2024-06-10
Payer: COMMERCIAL

## 2024-06-10 VITALS
SYSTOLIC BLOOD PRESSURE: 94 MMHG | BODY MASS INDEX: 25.58 KG/M2 | HEART RATE: 80 BPM | DIASTOLIC BLOOD PRESSURE: 76 MMHG | OXYGEN SATURATION: 96 % | TEMPERATURE: 97.9 F | HEIGHT: 62 IN | WEIGHT: 139 LBS

## 2024-06-10 DIAGNOSIS — Z51.89 ENCOUNTER FOR WOUND CARE: Primary | ICD-10-CM

## 2024-06-10 PROCEDURE — 99211 OFF/OP EST MAY X REQ PHY/QHP: CPT | Performed by: PHYSICIAN ASSISTANT

## 2024-06-10 NOTE — PROGRESS NOTES
Assessment/Plan:   Gladys Church is a 29 y.o.female who is here for   Chief Complaint   Patient presents with    Pilonidal Cyst     Patient is here today for a follow up pilonidal cyst check still having some drainage took the packing out Saturday          On exam found to have healing I and D site of pilonidal abscess.    Plan: Healing well with minimal drainage. Still o.5 cm opening probed about 1 cm depth. Return two weeks for a final wound check. We did speak about surgical removal as patient is 30 weeks pregnant and first pilonidal cyst we will not move forward with surgery at this time. Wound care supplies given        _______________________________________________________  CC:Pilonidal Cyst (Patient is here today for a follow up pilonidal cyst check still having some drainage took the packing out Saturday )  .    HPI:  Gladys Church is a 29 y.o.female who was referred for evaluation of Pilonidal Cyst (Patient is here today for a follow up pilonidal cyst check still having some drainage took the packing out Saturday )  .    Currently patient reports went to the ER 6/3/24 for a pilonidal abscess. They did I and D this with packing. They also placed her on Augmentin.  Reports: much improvement of pain. Discharge into packing daily and still has packing in as of today. Denies nausea, vomiting, fevers. Patient is 30 weeks pregnant.      ROS:  General ROS: negative  negative for - chills, fatigue, fever or night sweats, weight loss  Respiratory ROS: no cough, shortness of breath, or wheezing  Cardiovascular ROS: no chest pain or dyspnea on exertion  Genito-Urinary ROS: no dysuria, trouble voiding, or hematuria  Musculoskeletal ROS: negative for - gait disturbance, joint pain or muscle pain  Neurological ROS: no TIA or stroke symptoms  Skin ROS: See HPI  GI ROS: see HPI  Skin ROS: no new rashes or lesions   Lymphatic ROS: no new adenopathy noted by pt.   Psy ROS: no new mental or behavioral disturbances        Patient Active Problem List   Diagnosis    Chronic bilateral low back pain with bilateral sciatica    Asthma    Liver lesion    Blood pressure elevated without history of HTN    ASCUS with positive high risk HPV cervical    30 weeks gestation of pregnancy    Anemia, antepartum, third trimester    Pilonidal cyst         Allergies:  Cat hair extract, Dust mite extract, and Other      Current Outpatient Medications:     amoxicillin-clavulanate (AUGMENTIN) 875-125 mg per tablet, Take 1 tablet by mouth every 12 (twelve) hours for 7 days, Disp: 14 tablet, Rfl: 0    ferrous gluconate (FERGON) 324 mg tablet, Take 324 mg by mouth daily with breakfast, Disp: , Rfl:     Prenatal MV-Min-Fe Fum-FA-DHA (Prenatal+DHA) 28-0.975 & 200 MG MISC, Take 1 each by mouth daily, Disp: 90 each, Rfl: 0    Past Medical History:   Diagnosis Date    ASCUS of cervix with negative high risk HPV 12/15/2023    Asthma     Dysmenorrhea     Varicella        Past Surgical History:   Procedure Laterality Date    NO PAST SURGERIES         Family History   Problem Relation Age of Onset    Hypertension Mother     Asthma Father     Cancer Maternal Grandmother     Heart disease Maternal Grandmother     Hypertension Maternal Grandfather     Heart disease Paternal Grandmother     Diabetes Family     Breast cancer Neg Hx     Colon cancer Neg Hx     Ovarian cancer Neg Hx     Uterine cancer Neg Hx         reports that she has never smoked. She has never been exposed to tobacco smoke. She has never used smokeless tobacco. She reports that she does not currently use alcohol. She reports that she does not currently use drugs after having used the following drugs: Marijuana.    Vitals:    06/10/24 0756   BP: 94/76   Pulse: 80   Temp: 97.9 °F (36.6 °C)   SpO2: 96%        PHYSICAL EXAM  General Appearance:    Alert, cooperative, no distress,    Head:    Normocephalic without obvious abnormality   Eyes:    PERRL, conjunctiva/corneas clear     Neck:   Supple, no  adenopathy, no JVD   Back:      Lungs:      Heart:     Abdomen:      Extremities:   Extremities normal. No clubbing, cyanosis or edema   Psych:   Normal Affect, AOx3.    Neurologic:  Skin:   CNII-XII intact. Strength symmetric, speech intact    Warm, dry, intact, no visible rashes or lesions except as follows: 0.5 cm opening of superior vianney cleft. No packing needed Covered with one 4 x 4 and tape.                 Xiomara Abrams PA-C    Date: 6/10/2024 Time: 8:10 AM

## 2024-06-19 NOTE — ED ATTENDING ATTESTATION
"6/3/2024  I, Nahun Arambula MD, saw and evaluated the patient. I have discussed the patient with the resident/non-physician practitioner and agree with the resident's/non-physician practitioner's findings, Plan of Care, and MDM as documented in the resident's/non-physician practitioner's note, except where noted. All available labs and Radiology studies were reviewed.  I was present for key portions of any procedure(s) performed by the resident/non-physician practitioner and I was immediately available to provide assistance.       At this point I agree with the current assessment done in the Emergency Department.  I have conducted an independent evaluation of this patient a history and physical is as follows:    ED Course     Gladys is a very pleasant 29-year-old female at about 30 weeks gestation here for evaluation of a pilonidal cyst.  She noticed a small \"pimple\" in the upper part of her gluteal cleft about a week ago.  Over the past few days it has become larger and painful to touch.  Sitting is difficult.  She contacted her OB/GYN's office who referred her to general surgery but the patient ultimately came to the ED for evaluation due to worsening pain.  She denies any OB complaints.  Specifically no pelvic or abdominal pain, decreased fetal movement, vaginal bleeding or loss of fluid.  No urinary complaints.    On exam there is a raised area of fluctuance at the superior border of the gluteal cleft with some surrounding induration.  No overlying erythema, some warmth.  Consistent with pilonidal cyst/abscess.    Impression and plan: Pilonidal cyst versus pilonidal abscess.  No significant overlying erythema but somewhat warm.  Will perform bedside I&D.     I&D yielded significant amount of purulent drainage so we will start antibiotics and discharge with referral to general surgery.    Critical Care Time  Procedures      "

## 2024-06-20 PROBLEM — Z3A.32 32 WEEKS GESTATION OF PREGNANCY: Status: ACTIVE | Noted: 2024-01-31

## 2024-06-21 ENCOUNTER — ROUTINE PRENATAL (OUTPATIENT)
Dept: OBGYN CLINIC | Facility: CLINIC | Age: 29
End: 2024-06-21
Payer: COMMERCIAL

## 2024-06-21 VITALS
HEIGHT: 62 IN | HEART RATE: 88 BPM | SYSTOLIC BLOOD PRESSURE: 102 MMHG | OXYGEN SATURATION: 98 % | BODY MASS INDEX: 26.17 KG/M2 | DIASTOLIC BLOOD PRESSURE: 60 MMHG | WEIGHT: 142.2 LBS

## 2024-06-21 DIAGNOSIS — L05.91 PILONIDAL CYST: ICD-10-CM

## 2024-06-21 DIAGNOSIS — Z23 ENCOUNTER FOR IMMUNIZATION: ICD-10-CM

## 2024-06-21 DIAGNOSIS — O09.93 ENCOUNTER FOR SUPERVISION OF HIGH RISK PREGNANCY IN THIRD TRIMESTER, ANTEPARTUM: Primary | ICD-10-CM

## 2024-06-21 DIAGNOSIS — Z3A.32 32 WEEKS GESTATION OF PREGNANCY: ICD-10-CM

## 2024-06-21 DIAGNOSIS — O99.013 ANEMIA, ANTEPARTUM, THIRD TRIMESTER: ICD-10-CM

## 2024-06-21 PROCEDURE — 90715 TDAP VACCINE 7 YRS/> IM: CPT | Performed by: OBSTETRICS & GYNECOLOGY

## 2024-06-21 PROCEDURE — 90471 IMMUNIZATION ADMIN: CPT | Performed by: OBSTETRICS & GYNECOLOGY

## 2024-06-21 PROCEDURE — PNV: Performed by: OBSTETRICS & GYNECOLOGY

## 2024-06-21 NOTE — PROGRESS NOTES
"Assessment & Plan  29 y.o.  at 32w5d presenting for routine prenatal visit.     Problem List       Chronic bilateral low back pain with bilateral sciatica    Asthma    Liver lesion    Blood pressure elevated without history of HTN    ASCUS with positive high risk HPV cervical    Overview     From Pap smear with PCP in 2023  Recommend colposcopy  2024 Colpo: ALLYSON 1         32 weeks gestation of pregnancy    Overview     Prenatal labs wnl  MSAFP low risk  Contraception [ ]  Delivery consent [x]  GBS [ ]  Tdap [x]  F/u repeat growth scan at 35 wks         Anemia, antepartum, third trimester    Overview     Hemoglobin 10.1 on   PO iron prescribed  Repeat CBC 4 weeks         Pilonidal cyst    Overview     Drained in ER 6/3, Augmentin prescribed  Evaluated by General Surgery 24, no surgery at this time  Follows w/ Gen Surgery          Other Visit Diagnoses       Encounter for supervision of high risk pregnancy in third trimester, antepartum    -  Primary          ____________________________________________________________  Subjective  She is without complaint.   She denies contractions, loss of fluid, or vaginal bleeding.   She feels regular fetal movements.     Objective  /60 (BP Location: Right arm, Patient Position: Sitting, Cuff Size: Standard)   Pulse 88   Ht 5' 2\" (1.575 m)   Wt 64.5 kg (142 lb 3.2 oz)   LMP 2023 (Exact Date)   SpO2 98%   BMI 26.01 kg/m²   FHR: 130 bpm  Fundal height 31 cm    Physical Exam  Constitutional:       Appearance: Normal appearance.   HENT:      Head: Normocephalic and atraumatic.   Cardiovascular:      Rate and Rhythm: Normal rate.   Pulmonary:      Effort: Pulmonary effort is normal. No respiratory distress.   Abdominal:      Palpations: Abdomen is soft.      Tenderness: There is no abdominal tenderness.   Musculoskeletal:         General: Normal range of motion.   Neurological:      Mental Status: She is alert.   Skin:     General: Skin is warm " and dry.          Patient's Active Problem List  Patient Active Problem List   Diagnosis    Chronic bilateral low back pain with bilateral sciatica    Asthma    Liver lesion    Blood pressure elevated without history of HTN    ASCUS with positive high risk HPV cervical    32 weeks gestation of pregnancy    Anemia, antepartum, third trimester    Pilonidal cyst           Rima Ernst MD  6/21/2024  11:48 AM

## 2024-07-02 ENCOUNTER — ROUTINE PRENATAL (OUTPATIENT)
Dept: OBGYN CLINIC | Facility: CLINIC | Age: 29
End: 2024-07-02

## 2024-07-02 VITALS
BODY MASS INDEX: 26.28 KG/M2 | DIASTOLIC BLOOD PRESSURE: 60 MMHG | SYSTOLIC BLOOD PRESSURE: 98 MMHG | WEIGHT: 142.8 LBS | HEART RATE: 84 BPM | HEIGHT: 62 IN | OXYGEN SATURATION: 99 %

## 2024-07-02 DIAGNOSIS — Z3A.34 34 WEEKS GESTATION OF PREGNANCY: ICD-10-CM

## 2024-07-02 DIAGNOSIS — O99.013 ANEMIA, ANTEPARTUM, THIRD TRIMESTER: Primary | ICD-10-CM

## 2024-07-02 PROCEDURE — PNV: Performed by: OBSTETRICS & GYNECOLOGY

## 2024-07-02 NOTE — PROGRESS NOTES
"Assessment & Plan  29 y.o.  at 34w2d presenting for routine prenatal visit.       Problem List       Chronic bilateral low back pain with bilateral sciatica    Asthma    Liver lesion    Blood pressure elevated without history of HTN    ASCUS with positive high risk HPV cervical    Overview     From Pap smear with PCP in 2023  Recommend colposcopy  2024 Colpo: ALLYSON 1         34 weeks gestation of pregnancy    Overview     Prenatal labs wnl  MSAFP low risk  Contraception [ ]  Delivery consent [x]  GBS [ ]  Tdap [x]  F/u repeat growth scan at 35 wks         Anemia, antepartum, third trimester    Overview     Hemoglobin 10.1 on   PO iron prescribed  Repeat CBC 4 weeks         Pilonidal cyst    Overview     Drained in ER 6/3, Augmentin prescribed  Evaluated by General Surgery 24, no surgery at this time  Follows w/ Gen Surgery          ____________________________________________________________  Subjective  She is without complaint.   She denies contractions, loss of fluid, or vaginal bleeding.   She feels regular fetal movements.       Objective  BP 98/60 (BP Location: Right arm, Patient Position: Sitting, Cuff Size: Standard)   Pulse 84   Ht 5' 2\" (1.575 m)   Wt 64.8 kg (142 lb 12.8 oz)   LMP 2023 (Exact Date)   SpO2 99%   BMI 26.12 kg/m²   FHR: 140's via doppler    Physical Exam  Constitutional:       Appearance: She is well-developed.   Cardiovascular:      Rate and Rhythm: Normal rate and regular rhythm.      Heart sounds: Normal heart sounds. No murmur heard.     No friction rub. No gallop.   Pulmonary:      Effort: Pulmonary effort is normal. No respiratory distress.      Breath sounds: No wheezing.   Abdominal:      Palpations: Abdomen is soft.      Tenderness: There is no abdominal tenderness.   Musculoskeletal:         General: No tenderness.   Neurological:      Mental Status: She is alert and oriented to person, place, and time.   Vitals reviewed.          Patient's Active " Problem List  Patient Active Problem List   Diagnosis    Chronic bilateral low back pain with bilateral sciatica    Asthma    Liver lesion    Blood pressure elevated without history of HTN    ASCUS with positive high risk HPV cervical    34 weeks gestation of pregnancy    Anemia, antepartum, third trimester    Pilonidal cyst           Sarahi Delaney MD  7/2/2024  1:56 PM

## 2024-07-05 ENCOUNTER — ULTRASOUND (OUTPATIENT)
Dept: PERINATAL CARE | Facility: OTHER | Age: 29
End: 2024-07-05
Payer: COMMERCIAL

## 2024-07-05 VITALS
HEIGHT: 62 IN | DIASTOLIC BLOOD PRESSURE: 70 MMHG | WEIGHT: 144.2 LBS | SYSTOLIC BLOOD PRESSURE: 102 MMHG | BODY MASS INDEX: 26.54 KG/M2 | HEART RATE: 85 BPM

## 2024-07-05 DIAGNOSIS — Z3A.34 34 WEEKS GESTATION OF PREGNANCY: Primary | ICD-10-CM

## 2024-07-05 PROCEDURE — 76816 OB US FOLLOW-UP PER FETUS: CPT | Performed by: OBSTETRICS & GYNECOLOGY

## 2024-07-05 PROCEDURE — 99213 OFFICE O/P EST LOW 20 MIN: CPT | Performed by: OBSTETRICS & GYNECOLOGY

## 2024-07-08 NOTE — PROGRESS NOTES
A fetal ultrasound was completed. See Ob procedures in Epic for an interpretation and recommendations. Do not hesitate to contact us in [facility] if you have questions.    Sami Alexander MD, MSCE  Maternal Fetal Medicine

## 2024-07-15 PROBLEM — Z3A.36 36 WEEKS GESTATION OF PREGNANCY: Status: ACTIVE | Noted: 2024-01-31

## 2024-07-19 ENCOUNTER — ROUTINE PRENATAL (OUTPATIENT)
Dept: OBGYN CLINIC | Facility: CLINIC | Age: 29
End: 2024-07-19

## 2024-07-19 VITALS
WEIGHT: 146.4 LBS | DIASTOLIC BLOOD PRESSURE: 68 MMHG | HEART RATE: 103 BPM | OXYGEN SATURATION: 99 % | HEIGHT: 62 IN | SYSTOLIC BLOOD PRESSURE: 104 MMHG | BODY MASS INDEX: 26.94 KG/M2

## 2024-07-19 DIAGNOSIS — O09.93 ENCOUNTER FOR SUPERVISION OF HIGH RISK PREGNANCY IN THIRD TRIMESTER, ANTEPARTUM: Primary | ICD-10-CM

## 2024-07-19 DIAGNOSIS — O99.013 ANEMIA, ANTEPARTUM, THIRD TRIMESTER: ICD-10-CM

## 2024-07-19 DIAGNOSIS — Z3A.36 36 WEEKS GESTATION OF PREGNANCY: ICD-10-CM

## 2024-07-19 PROCEDURE — PNV: Performed by: OBSTETRICS & GYNECOLOGY

## 2024-07-19 PROCEDURE — 87150 DNA/RNA AMPLIFIED PROBE: CPT | Performed by: OBSTETRICS & GYNECOLOGY

## 2024-07-19 NOTE — PROGRESS NOTES
"Assessment & Plan  29 y.o.  at 36w5d presenting for routine prenatal visit.     Problem List       Chronic bilateral low back pain with bilateral sciatica    Asthma    Liver lesion    Blood pressure elevated without history of HTN    ASCUS with positive high risk HPV cervical    Overview     From Pap smear with PCP in 2023  Recommend colposcopy  2024 Colpo: ALLYSON 1         36 weeks gestation of pregnancy    Overview     Prenatal labs wnl  MSAFP low risk  Contraception [ ]  Delivery consent [x]  GBS [ ]  Tdap [x]  US : EFW 25%, VTX         Anemia, antepartum, third trimester    Overview     Hemoglobin 10.1 on   PO iron prescribed  Repeat CBC 4 weeks         Pilonidal cyst    Overview     Drained in ER 6/3, Augmentin prescribed  Evaluated by General Surgery 24, no surgery at this time  Follows w/ Gen Surgery          Other Visit Diagnoses       Encounter for supervision of high risk pregnancy in third trimester, antepartum    -  Primary          ____________________________________________________________  Subjective  She is without complaint.   She denies contractions, loss of fluid, or vaginal bleeding.   She feels regular fetal movements.     Objective  /68 (BP Location: Right arm, Patient Position: Sitting, Cuff Size: Standard)   Pulse 103   Ht 5' 2\" (1.575 m)   Wt 66.4 kg (146 lb 6.4 oz)   LMP 2023 (Exact Date)   SpO2 99%   BMI 26.78 kg/m²   FHR: 125 bpm  Fundal height 34 cm    Physical Exam  Constitutional:       Appearance: Normal appearance.   Genitourinary:      Vulva normal.   HENT:      Head: Normocephalic and atraumatic.   Cardiovascular:      Rate and Rhythm: Normal rate.   Pulmonary:      Effort: Pulmonary effort is normal. No respiratory distress.   Abdominal:      Palpations: Abdomen is soft.      Tenderness: There is no abdominal tenderness.   Musculoskeletal:         General: Normal range of motion.   Neurological:      Mental Status: She is alert. "   Skin:     General: Skin is warm and dry.          Patient's Active Problem List  Patient Active Problem List   Diagnosis    Chronic bilateral low back pain with bilateral sciatica    Asthma    Liver lesion    Blood pressure elevated without history of HTN    ASCUS with positive high risk HPV cervical    36 weeks gestation of pregnancy    Anemia, antepartum, third trimester    Pilonidal cyst           Rima Ernst MD  7/19/2024  8:47 AM

## 2024-07-22 LAB — GP B STREP DNA SPEC QL NAA+PROBE: NEGATIVE

## 2024-07-30 ENCOUNTER — ROUTINE PRENATAL (OUTPATIENT)
Dept: OBGYN CLINIC | Facility: CLINIC | Age: 29
End: 2024-07-30

## 2024-07-30 VITALS
HEIGHT: 62 IN | HEART RATE: 60 BPM | DIASTOLIC BLOOD PRESSURE: 60 MMHG | SYSTOLIC BLOOD PRESSURE: 106 MMHG | BODY MASS INDEX: 27.34 KG/M2 | WEIGHT: 148.6 LBS | OXYGEN SATURATION: 99 %

## 2024-07-30 DIAGNOSIS — O99.013 ANEMIA, ANTEPARTUM, THIRD TRIMESTER: Primary | ICD-10-CM

## 2024-07-30 DIAGNOSIS — Z3A.38 38 WEEKS GESTATION OF PREGNANCY: ICD-10-CM

## 2024-07-30 PROCEDURE — PNV: Performed by: OBSTETRICS & GYNECOLOGY

## 2024-07-30 NOTE — PROGRESS NOTES
"Assessment & Plan  29 y.o.  at 38w2d presenting for routine prenatal visit.       Problem List       Chronic bilateral low back pain with bilateral sciatica    Asthma    Liver lesion    Blood pressure elevated without history of HTN    ASCUS with positive high risk HPV cervical    Overview     From Pap smear with PCP in 2023  Recommend colposcopy  2024 Colpo: ALLYSON 1         38 weeks gestation of pregnancy    Overview     Prenatal labs wnl  MSAFP low risk  Contraception [ ]  Delivery consent [x]  GBS negative  Tdap [x]  US : EFW 25%, VTX  Wait for spontaneous labor         Anemia, antepartum, third trimester    Overview     Hemoglobin 10.1 on   PO iron prescribed  Repeat CBC 4 weeks         Pilonidal cyst    Overview     Drained in ER 6/3, Augmentin prescribed  Evaluated by General Surgery 24, no surgery at this time  Follows w/ Gen Surgery          ____________________________________________________________  Subjective  She is without complaint.   She denies contractions, loss of fluid, or vaginal bleeding.   She feels regular fetal movements.       Objective  /60 (BP Location: Right arm, Patient Position: Sitting, Cuff Size: Standard)   Pulse 60   Ht 5' 2\" (1.575 m)   Wt 67.4 kg (148 lb 9.6 oz)   LMP 2023 (Exact Date)   SpO2 99%   BMI 27.18 kg/m²   FHR: 130's via doppler  SVE: /-3    Physical Exam  Constitutional:       Appearance: She is well-developed.   Cardiovascular:      Rate and Rhythm: Normal rate and regular rhythm.      Heart sounds: Normal heart sounds. No murmur heard.     No friction rub. No gallop.   Pulmonary:      Effort: Pulmonary effort is normal. No respiratory distress.      Breath sounds: No wheezing.   Abdominal:      Palpations: Abdomen is soft.      Tenderness: There is no abdominal tenderness.   Musculoskeletal:         General: No tenderness.   Neurological:      Mental Status: She is alert and oriented to person, place, and time.   Vitals " reviewed.          Patient's Active Problem List  Patient Active Problem List   Diagnosis    Chronic bilateral low back pain with bilateral sciatica    Asthma    Liver lesion    Blood pressure elevated without history of HTN    ASCUS with positive high risk HPV cervical    38 weeks gestation of pregnancy    Anemia, antepartum, third trimester    Pilonidal cyst           Sarahi Delaney MD  7/30/2024  10:10 AM

## 2024-08-06 PROBLEM — Z3A.39 39 WEEKS GESTATION OF PREGNANCY: Status: ACTIVE | Noted: 2024-01-31

## 2024-08-08 ENCOUNTER — ROUTINE PRENATAL (OUTPATIENT)
Dept: OBGYN CLINIC | Facility: CLINIC | Age: 29
End: 2024-08-08

## 2024-08-08 VITALS — BODY MASS INDEX: 27.98 KG/M2 | DIASTOLIC BLOOD PRESSURE: 84 MMHG | SYSTOLIC BLOOD PRESSURE: 110 MMHG | WEIGHT: 153 LBS

## 2024-08-08 DIAGNOSIS — O09.93 ENCOUNTER FOR SUPERVISION OF HIGH RISK PREGNANCY IN THIRD TRIMESTER, ANTEPARTUM: Primary | ICD-10-CM

## 2024-08-08 DIAGNOSIS — O99.013 ANEMIA, ANTEPARTUM, THIRD TRIMESTER: ICD-10-CM

## 2024-08-08 DIAGNOSIS — Z3A.39 39 WEEKS GESTATION OF PREGNANCY: ICD-10-CM

## 2024-08-08 PROCEDURE — PNV: Performed by: OBSTETRICS & GYNECOLOGY

## 2024-08-08 NOTE — PROGRESS NOTES
Assessment & Plan  29 y.o.  at 39w4d presenting for routine prenatal visit.     Problem List       Chronic bilateral low back pain with bilateral sciatica    Asthma    Liver lesion    Blood pressure elevated without history of HTN    ASCUS with positive high risk HPV cervical    Overview     From Pap smear with PCP in 2023  Recommend colposcopy  2024 Colpo: ALLYSON 1         39 weeks gestation of pregnancy    Overview     Prenatal labs wnl  MSAFP low risk  Contraception [ ]  Delivery consent [x]  GBS negative  Tdap [x]  US : EFW 25%, VTX  Wait for spontaneous labor         Anemia, antepartum, third trimester    Overview     Hemoglobin 10.1 on   PO iron prescribed  Repeat CBC 4 weeks         Pilonidal cyst    Overview     Drained in ER 6/3, Augmentin prescribed  Evaluated by General Surgery 24, no surgery at this time  Follows w/ Gen Surgery          ____________________________________________________________  Subjective  She reports bloody discharge on Tuesday, that is now brown spotting.  She denies contractions, loss of fluid, or current bright red bleeding.  She feels regular fetal movements.     Objective  /84   Wt 69.4 kg (153 lb)   LMP 2023 (Exact Date)   BMI 27.98 kg/m²   FHR: 145 bpm  SVE: 370/-3    Physical Exam  Constitutional:       Appearance: Normal appearance.   Genitourinary:      Vulva normal.      No vaginal discharge.   HENT:      Head: Normocephalic and atraumatic.   Cardiovascular:      Rate and Rhythm: Normal rate.   Pulmonary:      Effort: Pulmonary effort is normal. No respiratory distress.   Abdominal:      Palpations: Abdomen is soft.      Tenderness: There is no abdominal tenderness.   Musculoskeletal:         General: Normal range of motion.   Neurological:      Mental Status: She is alert.   Skin:     General: Skin is warm and dry.          Patient's Active Problem List  Patient Active Problem List   Diagnosis    Chronic bilateral low back pain  with bilateral sciatica    Asthma    Liver lesion    Blood pressure elevated without history of HTN    ASCUS with positive high risk HPV cervical    39 weeks gestation of pregnancy    Anemia, antepartum, third trimester    Pilonidal cyst           Rimafrancois Ernst MD  8/8/2024  10:14 AM

## 2024-08-14 ENCOUNTER — HOSPITAL ENCOUNTER (INPATIENT)
Facility: HOSPITAL | Age: 29
LOS: 2 days | Discharge: HOME | End: 2024-08-16
Attending: OBSTETRICS & GYNECOLOGY | Admitting: OBSTETRICS & GYNECOLOGY
Payer: COMMERCIAL

## 2024-08-14 ENCOUNTER — ANESTHESIA (INPATIENT)
Dept: ANESTHESIOLOGY | Facility: HOSPITAL | Age: 29
End: 2024-08-14
Payer: COMMERCIAL

## 2024-08-14 ENCOUNTER — NURSE TRIAGE (OUTPATIENT)
Dept: OTHER | Facility: OTHER | Age: 29
End: 2024-08-14

## 2024-08-14 ENCOUNTER — ANESTHESIA EVENT (INPATIENT)
Dept: ANESTHESIOLOGY | Facility: HOSPITAL | Age: 29
End: 2024-08-14
Payer: COMMERCIAL

## 2024-08-14 DIAGNOSIS — Z3A.39 39 WEEKS GESTATION OF PREGNANCY: ICD-10-CM

## 2024-08-14 DIAGNOSIS — O42.90 LEAKAGE OF AMNIOTIC FLUID: Primary | ICD-10-CM

## 2024-08-14 LAB
ABO GROUP BLD: NORMAL
ALBUMIN SERPL BCG-MCNC: 3.4 G/DL (ref 3.5–5)
ALP SERPL-CCNC: 212 U/L (ref 34–104)
ALT SERPL W P-5'-P-CCNC: 15 U/L (ref 7–52)
ANION GAP SERPL CALCULATED.3IONS-SCNC: 8 MMOL/L (ref 4–13)
AST SERPL W P-5'-P-CCNC: 15 U/L (ref 13–39)
BASE EXCESS BLDCOA CALC-SCNC: -5.2 MMOL/L (ref 3–11)
BASE EXCESS BLDCOV CALC-SCNC: -6.3 MMOL/L (ref 1–9)
BILIRUB SERPL-MCNC: 0.25 MG/DL (ref 0.2–1)
BLD GP AB SCN SERPL QL: NEGATIVE
BUN SERPL-MCNC: 7 MG/DL (ref 5–25)
CALCIUM ALBUM COR SERPL-MCNC: 9.6 MG/DL (ref 8.3–10.1)
CALCIUM SERPL-MCNC: 9.1 MG/DL (ref 8.4–10.2)
CHLORIDE SERPL-SCNC: 108 MMOL/L (ref 96–108)
CO2 SERPL-SCNC: 21 MMOL/L (ref 21–32)
CREAT SERPL-MCNC: 0.65 MG/DL (ref 0.6–1.3)
ERYTHROCYTE [DISTWIDTH] IN BLOOD BY AUTOMATED COUNT: 16.4 % (ref 11.6–15.1)
GFR SERPL CREATININE-BSD FRML MDRD: 121 ML/MIN/1.73SQ M
GLUCOSE SERPL-MCNC: 89 MG/DL (ref 65–140)
HCO3 BLDCOA-SCNC: 21.9 MMOL/L (ref 17.3–27.3)
HCO3 BLDCOV-SCNC: 17.6 MMOL/L (ref 12.2–28.6)
HCT VFR BLD AUTO: 39 % (ref 34.8–46.1)
HGB BLD-MCNC: 13.2 G/DL (ref 11.5–15.4)
HOLD SPECIMEN: YES
MCH RBC QN AUTO: 28.8 PG (ref 26.8–34.3)
MCHC RBC AUTO-ENTMCNC: 33.8 G/DL (ref 31.4–37.4)
MCV RBC AUTO: 85 FL (ref 82–98)
O2 CT VFR BLDCOA CALC: 9 ML/DL
OXYHGB MFR BLDCOA: 41.9 %
OXYHGB MFR BLDCOV: 93.7 %
PCO2 BLDCOA: 48.2 MM[HG] (ref 30–60)
PCO2 BLDCOV: 30.6 MM HG (ref 27–43)
PH BLDCOA: 7.28 [PH] (ref 7.23–7.43)
PH BLDCOV: 7.38 [PH] (ref 7.19–7.49)
PLATELET # BLD AUTO: 229 THOUSANDS/UL (ref 149–390)
PMV BLD AUTO: 9.9 FL (ref 8.9–12.7)
PO2 BLDCOA: 21.1 MM HG (ref 5–25)
PO2 BLDCOV: 61.7 MM HG (ref 15–45)
POTASSIUM SERPL-SCNC: 3.8 MMOL/L (ref 3.5–5.3)
PROT SERPL-MCNC: 6 G/DL (ref 6.4–8.4)
RBC # BLD AUTO: 4.58 MILLION/UL (ref 3.81–5.12)
RH BLD: POSITIVE
SAO2 % BLDCOV: 18.7 ML/DL
SODIUM SERPL-SCNC: 137 MMOL/L (ref 135–147)
SPECIMEN EXPIRATION DATE: NORMAL
TREPONEMA PALLIDUM IGG+IGM AB [PRESENCE] IN SERUM OR PLASMA BY IMMUNOASSAY: NORMAL
WBC # BLD AUTO: 11.17 THOUSAND/UL (ref 4.31–10.16)

## 2024-08-14 PROCEDURE — 86901 BLOOD TYPING SEROLOGIC RH(D): CPT

## 2024-08-14 PROCEDURE — 86850 RBC ANTIBODY SCREEN: CPT

## 2024-08-14 PROCEDURE — 85027 COMPLETE CBC AUTOMATED: CPT

## 2024-08-14 PROCEDURE — NC001 PR NO CHARGE: Performed by: OBSTETRICS & GYNECOLOGY

## 2024-08-14 PROCEDURE — 86900 BLOOD TYPING SEROLOGIC ABO: CPT

## 2024-08-14 PROCEDURE — 4A1HXCZ MONITORING OF PRODUCTS OF CONCEPTION, CARDIAC RATE, EXTERNAL APPROACH: ICD-10-PCS | Performed by: OBSTETRICS & GYNECOLOGY

## 2024-08-14 PROCEDURE — 0KQM0ZZ REPAIR PERINEUM MUSCLE, OPEN APPROACH: ICD-10-PCS | Performed by: OBSTETRICS & GYNECOLOGY

## 2024-08-14 PROCEDURE — 82805 BLOOD GASES W/O2 SATURATION: CPT | Performed by: OBSTETRICS & GYNECOLOGY

## 2024-08-14 PROCEDURE — 99202 OFFICE O/P NEW SF 15 MIN: CPT

## 2024-08-14 PROCEDURE — 80053 COMPREHEN METABOLIC PANEL: CPT

## 2024-08-14 PROCEDURE — 59400 OBSTETRICAL CARE: CPT | Performed by: OBSTETRICS & GYNECOLOGY

## 2024-08-14 PROCEDURE — G0463 HOSPITAL OUTPT CLINIC VISIT: HCPCS

## 2024-08-14 PROCEDURE — 86780 TREPONEMA PALLIDUM: CPT

## 2024-08-14 RX ORDER — SODIUM CHLORIDE, SODIUM LACTATE, POTASSIUM CHLORIDE, CALCIUM CHLORIDE 600; 310; 30; 20 MG/100ML; MG/100ML; MG/100ML; MG/100ML
125 INJECTION, SOLUTION INTRAVENOUS CONTINUOUS
Status: DISCONTINUED | OUTPATIENT
Start: 2024-08-14 | End: 2024-08-14

## 2024-08-14 RX ORDER — SIMETHICONE 80 MG
80 TABLET,CHEWABLE ORAL 4 TIMES DAILY PRN
Status: DISCONTINUED | OUTPATIENT
Start: 2024-08-14 | End: 2024-08-16 | Stop reason: HOSPADM

## 2024-08-14 RX ORDER — MAGNESIUM HYDROXIDE/ALUMINUM HYDROXICE/SIMETHICONE 120; 1200; 1200 MG/30ML; MG/30ML; MG/30ML
15 SUSPENSION ORAL EVERY 6 HOURS PRN
Status: DISCONTINUED | OUTPATIENT
Start: 2024-08-14 | End: 2024-08-16 | Stop reason: HOSPADM

## 2024-08-14 RX ORDER — BUPIVACAINE HYDROCHLORIDE 2.5 MG/ML
30 INJECTION, SOLUTION EPIDURAL; INFILTRATION; INTRACAUDAL ONCE AS NEEDED
Status: DISCONTINUED | OUTPATIENT
Start: 2024-08-14 | End: 2024-08-14

## 2024-08-14 RX ORDER — ROPIVACAINE HYDROCHLORIDE 2 MG/ML
INJECTION, SOLUTION EPIDURAL; INFILTRATION; PERINEURAL AS NEEDED
Status: DISCONTINUED | OUTPATIENT
Start: 2024-08-14 | End: 2024-08-14 | Stop reason: HOSPADM

## 2024-08-14 RX ORDER — BENZOCAINE/MENTHOL 6 MG-10 MG
1 LOZENGE MUCOUS MEMBRANE DAILY PRN
Status: DISCONTINUED | OUTPATIENT
Start: 2024-08-14 | End: 2024-08-16 | Stop reason: HOSPADM

## 2024-08-14 RX ORDER — ONDANSETRON 2 MG/ML
4 INJECTION INTRAMUSCULAR; INTRAVENOUS EVERY 8 HOURS PRN
Status: DISCONTINUED | OUTPATIENT
Start: 2024-08-14 | End: 2024-08-16 | Stop reason: HOSPADM

## 2024-08-14 RX ORDER — DIPHENHYDRAMINE HCL 25 MG
25 TABLET ORAL EVERY 6 HOURS PRN
Status: DISCONTINUED | OUTPATIENT
Start: 2024-08-14 | End: 2024-08-16 | Stop reason: HOSPADM

## 2024-08-14 RX ORDER — IBUPROFEN 600 MG/1
600 TABLET, FILM COATED ORAL EVERY 6 HOURS
Status: DISCONTINUED | OUTPATIENT
Start: 2024-08-14 | End: 2024-08-16 | Stop reason: HOSPADM

## 2024-08-14 RX ORDER — DOCUSATE SODIUM 100 MG/1
100 CAPSULE, LIQUID FILLED ORAL 2 TIMES DAILY
Status: DISCONTINUED | OUTPATIENT
Start: 2024-08-14 | End: 2024-08-16 | Stop reason: HOSPADM

## 2024-08-14 RX ORDER — SENNOSIDES 8.6 MG
1 TABLET ORAL DAILY
Status: DISCONTINUED | OUTPATIENT
Start: 2024-08-15 | End: 2024-08-16 | Stop reason: HOSPADM

## 2024-08-14 RX ORDER — CALCIUM CARBONATE 500 MG/1
1000 TABLET, CHEWABLE ORAL 3 TIMES DAILY PRN
Status: DISCONTINUED | OUTPATIENT
Start: 2024-08-14 | End: 2024-08-16 | Stop reason: HOSPADM

## 2024-08-14 RX ORDER — LIDOCAINE HYDROCHLORIDE AND EPINEPHRINE 15; 5 MG/ML; UG/ML
INJECTION, SOLUTION EPIDURAL AS NEEDED
Status: DISCONTINUED | OUTPATIENT
Start: 2024-08-14 | End: 2024-08-14 | Stop reason: HOSPADM

## 2024-08-14 RX ORDER — OXYTOCIN/RINGER'S LACTATE 30/500 ML
1-30 PLASTIC BAG, INJECTION (ML) INTRAVENOUS
Status: DISCONTINUED | OUTPATIENT
Start: 2024-08-14 | End: 2024-08-14

## 2024-08-14 RX ORDER — OXYTOCIN/RINGER'S LACTATE 30/500 ML
250 PLASTIC BAG, INJECTION (ML) INTRAVENOUS CONTINUOUS
Status: ACTIVE | OUTPATIENT
Start: 2024-08-14 | End: 2024-08-14

## 2024-08-14 RX ORDER — ACETAMINOPHEN 325 MG/1
650 TABLET ORAL EVERY 6 HOURS
Status: DISCONTINUED | OUTPATIENT
Start: 2024-08-14 | End: 2024-08-16 | Stop reason: HOSPADM

## 2024-08-14 RX ADMIN — SODIUM CHLORIDE, SODIUM LACTATE, POTASSIUM CHLORIDE, AND CALCIUM CHLORIDE 125 ML/HR: .6; .31; .03; .02 INJECTION, SOLUTION INTRAVENOUS at 15:03

## 2024-08-14 RX ADMIN — WITCH HAZEL 1 PAD: 500 SOLUTION RECTAL; TOPICAL at 23:44

## 2024-08-14 RX ADMIN — BENZOCAINE AND LEVOMENTHOL 1 APPLICATION: 200; 5 SPRAY TOPICAL at 23:45

## 2024-08-14 RX ADMIN — SODIUM CHLORIDE, SODIUM LACTATE, POTASSIUM CHLORIDE, AND CALCIUM CHLORIDE 999 ML/HR: .6; .31; .03; .02 INJECTION, SOLUTION INTRAVENOUS at 08:40

## 2024-08-14 RX ADMIN — ACETAMINOPHEN 650 MG: 325 TABLET ORAL at 21:30

## 2024-08-14 RX ADMIN — LIDOCAINE HYDROCHLORIDE AND EPINEPHRINE 5 ML: 15; 5 INJECTION, SOLUTION EPIDURAL at 08:55

## 2024-08-14 RX ADMIN — ROPIVACAINE HYDROCHLORIDE 8 ML: 2 INJECTION EPIDURAL; INFILTRATION; PERINEURAL at 09:01

## 2024-08-14 RX ADMIN — MORPHINE SULFATE 2 MG: 2 INJECTION, SOLUTION INTRAMUSCULAR; INTRAVENOUS at 07:20

## 2024-08-14 RX ADMIN — ROPIVACAINE HYDROCHLORIDE 8 ML/HR: 2 INJECTION EPIDURAL; INFILTRATION; PERINEURAL at 09:05

## 2024-08-14 RX ADMIN — ROPIVACAINE HYDROCHLORIDE: 2 INJECTION, SOLUTION EPIDURAL; INFILTRATION at 09:08

## 2024-08-14 RX ADMIN — SODIUM CHLORIDE, SODIUM LACTATE, POTASSIUM CHLORIDE, AND CALCIUM CHLORIDE 125 ML/HR: .6; .31; .03; .02 INJECTION, SOLUTION INTRAVENOUS at 19:19

## 2024-08-14 RX ADMIN — ROPIVACAINE HYDROCHLORIDE: 2 INJECTION, SOLUTION EPIDURAL; INFILTRATION at 17:58

## 2024-08-14 RX ADMIN — SODIUM CHLORIDE, SODIUM LACTATE, POTASSIUM CHLORIDE, AND CALCIUM CHLORIDE 125 ML/HR: .6; .31; .03; .02 INJECTION, SOLUTION INTRAVENOUS at 05:23

## 2024-08-14 RX ADMIN — OXYTOCIN 2 MILLI-UNITS/MIN: 10 INJECTION, SOLUTION INTRAMUSCULAR; INTRAVENOUS at 05:24

## 2024-08-14 NOTE — OB LABOR/OXYTOCIN SAFETY PROGRESS
Oxytocin Safety Progress Check Note - Gladys Church 29 y.o. female MRN: [id number]    Unit/Bed#: -01 Encounter: [id number]    Dose (lolita-units/min) Oxytocin: 2 lolita-units/min  Contraction Frequency (minutes): 3-5  Contraction Intensity: Moderate  Uterine Activity Characteristics: Regular  Cervical Dilation: 7        Cervical Effacement: 80  Fetal Station: -1  Baseline Rate (FHR): 135 bpm  Fetal Heart Rate (FHT): 130 BPM  FHR Category: I               Vital Signs:   Vitals:    08/14/24 1332   BP: 93/54   Pulse: 90   Temp:    SpO2:        Notes/comments:   Pt comfortable with the contractions, progressing in labor.  On pitocin and epidural.       Jenn Ruiz MD 8/14/2024 1:54 PM

## 2024-08-14 NOTE — OB LABOR/OXYTOCIN SAFETY PROGRESS
Oxytocin Safety Progress Check Note - Gladys Church 29 y.o. female MRN: [id number]    Unit/Bed#: -01 Encounter: [id number]    Dose (lolita-units/min) Oxytocin: 3 lolita-units/min  Contraction Frequency (minutes): 2-3  Contraction Intensity: Moderate  Uterine Activity Characteristics: Regular  Cervical Dilation: 7-8        Cervical Effacement: 80  Fetal Station: 0  Baseline Rate (FHR): 140 bpm  Fetal Heart Rate (FHT): 130 BPM  FHR Category: I               Vital Signs:   Vitals:    08/14/24 1518   BP: 135/65   Pulse: 89   Resp:    Temp:    SpO2:        Notes/comments:   Pt progressing in labor. On pitocin titration.     Jenn Ruiz MD 8/14/2024 4:06 PM

## 2024-08-14 NOTE — H&P
"H & P- Obstetrics   Gladys Church 29 y.o. female MRN: [id number]  Unit/Bed#: -01 Encounter: [id number]    Assessment: 29 y.o.  at 40w3d admitted for spontaneous rupture of membranes.  SVE: 3/70/-3  FHT: cat 1  Clinical EFW: 25%; Cephalic confirmed by TAUS  GBS status: negative     Plan:   Anemia, antepartum, third trimester  Assessment & Plan  F/u admission HgB    39 weeks gestation of pregnancy  Assessment & Plan  PNL wnl   A+  GBS neg  EFW 2302 grams - 5 lbs 1 oz (25%) at 34w5d    ASCUS with positive high risk HPV cervical  Assessment & Plan  From Pap smear with PCP in 2023  Recommend colposcopy  2024 Colpo: ALLYSON 1  F/u outpatient    Blood pressure elevated without history of HTN  Assessment & Plan  \"Elevated BP\" at initial PNV noted to be 133/80  No actual elevated BP per criteria throughout pregnancy  Monitor BP closely    Asthma  Assessment & Plan  Avoid hemabate    * SROM vs PROM  Assessment & Plan  Admitted for SROM vs PROM  Admission labs - CBC, T&S, syphilis screen  Clear liquid diet  Anesthesia consult placed   Rh+, Rhogam not indicated   GBS-, prophylaxis not indicated  Patient would like to be rechecked after labs and IV placement and if unchanged is amenble to pitocin, IOL consent signed        Discussed case and plan w/ Dr. Marte.      Chief Complaint: leaking fluid    HPI: Gladys Church is a 29 y.o.  with an AMY of 2024, by Last Menstrual Period at 40w3d who is being admitted for premature ruptue of membranes. She complains of uterine contractions, occurring every 10-20 minutes, has no LOF and large amounts of fluid leaking, and reports no VB. She states she has felt good FM.    Patient Active Problem List   Diagnosis    Chronic bilateral low back pain with bilateral sciatica    Asthma    Liver lesion    Blood pressure elevated without history of HTN    ASCUS with positive high risk HPV cervical    39 weeks gestation of pregnancy    Anemia, antepartum, third " trimester    Pilonidal cyst    SROM vs PROM       Baby complications/comments: none    Review of Systems   Constitutional:  Negative for chills and fever.   Respiratory:  Negative for cough and shortness of breath.    Gastrointestinal:  Negative for diarrhea, nausea and vomiting.   Genitourinary:  Negative for dysuria and hematuria.   Skin:  Negative for color change and rash.   Neurological:  Negative for dizziness, syncope and headaches.       OB Hx:  OB History    Para Term  AB Living   1 0 0 0 0 0   SAB IAB Ectopic Multiple Live Births   0 0 0 0 0      # Outcome Date GA Lbr Kyle/2nd Weight Sex Type Anes PTL Lv   1 Current               Obstetric Comments   Menarche 12       Past Medical Hx:  Past Medical History:   Diagnosis Date    ASCUS of cervix with negative high risk HPV 12/15/2023    Asthma     Dysmenorrhea     Varicella        Past Surgical hx:  Past Surgical History:   Procedure Laterality Date    NO PAST SURGERIES         Social Hx:  Alcohol use: denies  Tobacco use: denies  Other substance use: denies    Allergies   Allergen Reactions    Cat Hair Extract     Dust Mite Extract     Other Allergic Rhinitis         Medications Prior to Admission:     ferrous gluconate (FERGON) 324 mg tablet    Prenatal MV-Min-Fe Fum-FA-DHA (Prenatal+DHA) 28-0.975 & 200 MG MISC    Objective:  Temp:  [97.6 °F (36.4 °C)] 97.6 °F (36.4 °C)  HR:  [73] 73  BP: (122)/(82) 122/82  Body mass index is 27.98 kg/m².     Physical Exam:  Physical Exam  Constitutional:       Appearance: Normal appearance.   Genitourinary:      Vulva normal.   Cardiovascular:      Rate and Rhythm: Normal rate.   Pulmonary:      Effort: Pulmonary effort is normal.   Abdominal:      Palpations: Abdomen is soft.   Neurological:      General: No focal deficit present.      Mental Status: She is alert and oriented to person, place, and time.   Skin:     General: Skin is dry.   Psychiatric:         Mood and Affect: Mood normal.           FHT:  Baseline Rate (FHR): 125 bpm  Variability: Moderate  Accelerations: 15 x 15 or greater    TOCO:   Contraction Frequency (minutes): 2-3  Contraction Intensity: Mild    Lab Results   Component Value Date    WBC 9.91 05/20/2024    HGB 10.1 (L) 05/20/2024    HCT 30.4 (L) 05/20/2024     05/20/2024     Lab Results   Component Value Date    K 4.4 09/24/2021     09/24/2021    CO2 27 09/24/2021    BUN 11 09/24/2021    CREATININE 0.71 09/24/2021    AST 13 09/24/2021    ALT 25 09/24/2021     Prenatal Labs: Reviewed      Blood type: A+  Antibody: negative  GBS: negative  HIV: non-reactive  Rubella: immune  Syphilis IgM/IgG: non-reactive  HBsAg: non-reactive  HCAb: non-reactive  Chlamydia: negative  Gonorrhea: negative  Diabetes 1 hour screen: passed  3 hour glucose: n/a  Platelets: 295, pending admission CBC  Hgb: 10.1, pending admission CBC  >2 Midnights  INPATIENT     Signature/Title: Pilar Murdock MD  Date: 8/14/2024  Time: 3:40 AM

## 2024-08-14 NOTE — ASSESSMENT & PLAN NOTE
Continue routine post partum care  Encourage ambulation  Encourage breastfeeding  Contraception: POPs  Anticipate discharge PPD 02

## 2024-08-14 NOTE — OB LABOR/OXYTOCIN SAFETY PROGRESS
Oxytocin Safety Progress Check Note - Gladys Church 29 y.o. female MRN: [id number]    Unit/Bed#: -01 Encounter: [id number]    Dose (lolita-units/min) Oxytocin: 2 lolita-units/min  Contraction Frequency (minutes): 2-3  Contraction Intensity: Moderate  Uterine Activity Characteristics: Regular  Cervical Dilation: 6        Cervical Effacement: 80  Fetal Station: -1  Baseline Rate (FHR): 125 bpm  Fetal Heart Rate (FHT): 140 BPM  FHR Category: 2               Vital Signs:   Vitals:    08/14/24 1113   BP: (!) 82/50   Pulse: 100   Temp:    SpO2:        Notes/comments:   Variables noted, patient repositioned and oxytocin decreased to 2       Cindi Li MD 8/14/2024 11:42 AM

## 2024-08-14 NOTE — ASSESSMENT & PLAN NOTE
"\"Elevated BP\" at initial PNV noted to be 130/88  No actual elevated BP per criteria throughout pregnancy  Monitor BP closely  Systolic (24hrs), Av , Min:95 , Max:135   Diastolic (24hrs), Av, Min:63, Max:84     "

## 2024-08-14 NOTE — PLAN OF CARE
Problem: BIRTH - VAGINAL/ SECTION  Goal: Fetal and maternal status remain reassuring during the birth process  Description: INTERVENTIONS:  - Monitor vital signs  - Monitor fetal heart rate  - Monitor uterine activity  - Monitor labor progression (vaginal delivery)  - DVT prophylaxis  - Antibiotic prophylaxis  Outcome: Progressing  Goal: Emotionally satisfying birthing experience for mother/fetus  Description: Interventions:  - Assess, plan, implement and evaluate the nursing care given to the patient in labor  - Advocate the philosophy that each childbirth experience is a unique experience and support the family's chosen level of involvement and control during the labor process   - Actively participate in both the patient's and family's teaching of the birth process  - Consider cultural, Zoroastrianism and age-specific factors and plan care for the patient in labor  Outcome: Progressing     Problem: Knowledge Deficit  Goal: Verbalizes understanding of labor plan  Description: Assess patient/family/caregiver's baseline knowledge level and ability to understand information.  Provide education via patient/family/caregiver's preferred learning method at appropriate level of understanding.     1. Provide teaching at level of understanding.  2. Provide teaching via preferred learning method(s).  Outcome: Progressing     Problem: Labor & Delivery  Goal: Manages discomfort  Description: Assess and monitor for signs and symptoms of discomfort.  Assess patient's pain level regularly and per hospital policy.  Administer medications as ordered. Support use of nonpharmacological methods to help control pain such as distraction, imagery, relaxation, and application of heat and cold.  Collaborate with interdisciplinary team and patient to determine appropriate pain management plan.    1. Include patient in decisions related to comfort.  2. Offer non-pharmacological pain management interventions.  3. Report ineffective pain  management to physician.  Outcome: Progressing  Goal: Patient vital signs are stable  Description: 1. Assess vital signs - vaginal delivery.  Outcome: Progressing

## 2024-08-14 NOTE — ASSESSMENT & PLAN NOTE
From Pap smear with PCP in October 2023  Recommend colposcopy  2/2024 Colpo: ALLYSON 1  F/u outpatient

## 2024-08-14 NOTE — TELEPHONE ENCOUNTER
"Reason for Disposition  • Leakage of fluid from vagina    Answer Assessment - Initial Assessment Questions  1. ONSET: \"When did the symptoms begin?\"         20 minutes    2. CONTRACTIONS: \"Are you having any contractions?\" If yes, ask: \"Describe the contractions that you are having.\" (e.g., duration, frequency, regularity, severity)      Over 10 minutes, not consistent    3. AMY: \"What date are you expecting to deliver?\"      8/11/24    4. PARITY: \"Have you had a baby before?\" If Yes, ask: \"How long did the labor last?\"      Denies    5. FETAL MOVEMENT: \"Has the baby's movement decreased or changed significantly from normal?\"      Not moving now but was moving earlier     6. OTHER SYMPTOMS: \"Do you have any other symptoms?\" (e.g., abdominal pain, vaginal bleeding, fever, hand/facial swelling)  Denies    Protocols used: Pregnancy - Rupture of Membranes-ADULT-AH    "

## 2024-08-14 NOTE — ANESTHESIA PREPROCEDURE EVALUATION
Procedure:  LABOR ANALGESIA    Relevant Problems   ANESTHESIA (within normal limits)      CARDIO (within normal limits)      ENDO (within normal limits)      GI/HEPATIC   (+) Liver lesion      /RENAL (within normal limits)      GYN   (+) 39 weeks gestation of pregnancy      HEMATOLOGY   (+) Anemia, antepartum, third trimester      MUSCULOSKELETAL   (+) Chronic bilateral low back pain with bilateral sciatica      NEURO/PSYCH   (+) Chronic bilateral low back pain with bilateral sciatica      PULMONARY   (+) Asthma        Physical Exam    Airway    Mallampati score: III  TM Distance: >3 FB  Neck ROM: full     Dental   No notable dental hx     Cardiovascular  Cardiovascular exam normal    Pulmonary  Pulmonary exam normal     Other Findings  post-pubertal.      Anesthesia Plan  ASA Score- 2     Anesthesia Type- epidural with ASA Monitors.         Additional Monitors:     Airway Plan:            Plan Factors-Exercise tolerance (METS): >4 METS.    Chart reviewed.   Existing labs reviewed. Patient summary reviewed.    Patient is not a current smoker.              Induction- intravenous.    Postoperative Plan-     Perioperative Resuscitation Plan - Level 1 - Full Code.       Informed Consent- Anesthetic plan and risks discussed with patient and spouse.

## 2024-08-14 NOTE — ANESTHESIA PROCEDURE NOTES
Epidural Block    Patient location during procedure: OB/L&D  Start time: 8/14/2024 8:55 AM  Reason for block: primary anesthetic  Staffing  Performed by: Pelon Lund CRNA  Authorized by: Kishor Sher DO    Preanesthetic Checklist  Completed: patient identified, IV checked, site marked, risks and benefits discussed, surgical consent, monitors and equipment checked, pre-op evaluation and timeout performed  Epidural  Patient position: sitting  Prep: ChloraPrep  Sedation Level: no sedation  Patient monitoring: heart rate and frequent blood pressure checks  Approach: midline  Location: lumbar, L3-4  Injection technique: RUBY air and RUBY saline  Needle  Needle type: Tuohy   Needle gauge: 17 G  Needle insertion depth: 4.5 cm  Catheter type: closed tip and multi-orifice  Catheter size: 19 G  Catheter at skin depth: 6 cm  Catheter securement method: clear occlusive dressing  Test dose: negative  Assessment  Number of attempts: 1negative aspiration for CSF, negative aspiration for heme and no paresthesia on injection  patient tolerated the procedure well with no immediate complications

## 2024-08-14 NOTE — OB LABOR/OXYTOCIN SAFETY PROGRESS
Oxytocin Safety Progress Check Note - Gladys Church 29 y.o. female MRN: [id number]    Unit/Bed#: -01 Encounter: [id number]    Dose (lolita-units/min) Oxytocin: 4 lolita-units/min  Contraction Frequency (minutes): 2  Contraction Intensity: Moderate  Uterine Activity Characteristics: Regular  Cervical Dilation: 4        Cervical Effacement: 80  Fetal Station: -2  Baseline Rate (FHR): 125 bpm  Fetal Heart Rate (FHT): 140 BPM  FHR Category: 1               Vital Signs:   Vitals:    08/14/24 0918   BP: 112/74   Pulse: (!) 112   Temp:    SpO2:        Notes/comments:   Comfortable with epidural  Continue oxytocin    Cindi Li MD 8/14/2024 9:29 AM

## 2024-08-14 NOTE — OB LABOR/OXYTOCIN SAFETY PROGRESS
Oxytocin Safety Progress Check Note - Gladys Church 29 y.o. female MRN: [id number]    Unit/Bed#: -01 Encounter: [id number]    Dose (lolita-units/min) Oxytocin: 2 lolita-units/min  Contraction Frequency (minutes): 2-6  Contraction Intensity: Mild  Uterine Activity Characteristics: Regular  Cervical Dilation: 3        Cervical Effacement: 70  Fetal Station: -3  Baseline Rate (FHR): 125 bpm     FHR Category: 1               Vital Signs:   Vitals:    08/14/24 0241   BP: 122/82   Pulse: 73   Temp: 97.6 °F (36.4 °C)       Patient janett regularly and stating that she is feeling them more often and intensely. SVE remains unchanged. Patient amenable to pitocin at this time. Plan to start pitocin and monitor closely.     Pilar Murdock MD 8/14/2024 5:35 AM       pt with concern for IBD, will check basics, ESR/CRP, will pain control, consider transfer to [facility].

## 2024-08-14 NOTE — ASSESSMENT & PLAN NOTE
Drained in ER 6/3, Augmentin prescribed  Evaluated by General Surgery 6/6/24, no surgery at this time  Follows w/ Gen Surgery

## 2024-08-15 PROCEDURE — 99024 POSTOP FOLLOW-UP VISIT: CPT | Performed by: OBSTETRICS & GYNECOLOGY

## 2024-08-15 RX ADMIN — ACETAMINOPHEN 650 MG: 325 TABLET ORAL at 03:33

## 2024-08-15 RX ADMIN — ACETAMINOPHEN 650 MG: 325 TABLET ORAL at 09:14

## 2024-08-15 RX ADMIN — ACETAMINOPHEN 650 MG: 325 TABLET ORAL at 18:17

## 2024-08-15 RX ADMIN — IBUPROFEN 600 MG: 600 TABLET, FILM COATED ORAL at 09:14

## 2024-08-15 RX ADMIN — DOCUSATE SODIUM 100 MG: 100 CAPSULE, LIQUID FILLED ORAL at 09:14

## 2024-08-15 RX ADMIN — IBUPROFEN 600 MG: 600 TABLET, FILM COATED ORAL at 18:17

## 2024-08-15 RX ADMIN — SENNOSIDES 8.6 MG: 8.6 TABLET, FILM COATED ORAL at 09:14

## 2024-08-15 NOTE — LACTATION NOTE
This note was copied from a baby's chart.  CONSULT - LACTATION  Baby Boy Church (Ashley) 1 days male MRN: [id number]    [facility] NURSERY Room / Bed: (N)/(N) Encounter: [id number]    Maternal Information     MOTHER:  Gladys Church  Maternal Age: 29 y.o.  OB History: # 1 - Date: 24, Sex: Male, Weight: 3310 g (7 lb 4.8 oz), GA: 40w3d, Type: Vaginal, Spontaneous, Apgar1: 8, Apgar5: 9, Living: Living, Birth Comments: None   Previouse breast reduction surgery? No    Lactation history:   Has patient previously breast fed: No   How long had patient previously breast fed:     Previous breast feeding complications:       Past Surgical History:   Procedure Laterality Date    NO PAST SURGERIES         Birth information:  YOB: 2023   Time of birth: 8:25 PM   Sex: male   Delivery type: Vaginal, Spontaneous   Birth Weight: 3310 g (7 lb 4.8 oz)   Percent of Weight Change: 0%     Gestational Age: 40w3d   [unfilled]    Assessment     Breast and nipple assessment: flat nipple on right and slightly invert nipple on left which everts slightly with stimulation    [place] Assessment: normal assessment    Feeding assessment: latch difficulty (due to nipples)  LATCH:  Latch: Grasps breast, tongue down, lips flanged, rhythmic sucking   Audible Swallowing: None   Type of Nipple: Flat (right nipple flat and left slightly inverted but everts small amount with stimulation)   Comfort (Breast/Nipple): Soft/non-tender   Hold (Positioning): Partial assist, teach one side, mother does other, staff holds   LATCH Score: 6          Feeding recommendations: breast feed on demand; attempt to latch baby with cueing/hunger signs, then after 5-10 min on each side if no latch or no sustained latch, feed baby expressed milk or formula and follow up with pumping; use massage and hand expression before feeding and after and utilize latch assist and nipple shield for latching  supplement  with expressed colostrum and formula via syringe and bottle and nipple    Met with mother and father and provided and reviewed Ready, Set, Baby and Discharge Booklet. Discussed importance of skin to skin for bonding, temperature regulation, and breastfeeding. Partner is able to participate in skin to skin as well. Discussed baby's feeding and sleeping patterns for the first and second day and what to expect. Talked about Hunger and fullness cues and how to catch early feeding cues which will aide in breastfeeding. Discussed the importance of feeding on demand. To feed baby every 2-3 hours. Baby to feed at least 8 times in a 24 hour period. Talked about supply and demand and milk transitions times.      Talked about benefits of hand expression and massage before feeding. Practiced hand expression and expressed a few drops after baby was latched for a few minutes. Talked about first milk and the benefits of colostrum for baby's immune system. Talked about baby's stomach size and discussed expectations for urine count and stool in the first 24 hours and beyond.  Went over steps for latching baby properly to avoid nipple pain and efficient stimulation and milk removal. Assessed Latch using football hold. Discussed importance of positioning for mother and baby. Nose to nipple, mouth wide, flanged lips, double chin and lower jaw movement. Due to nipples, mother to use tea cup hold and bring baby close to achieve deep latch and sustain latch and for efficient milk removal. Education on using latch assist completed. Parents are supplementing. Education on Supplementing, volume and paced bottle feeding. Parents given slow flow nipples to use with subsequent feedings.      Encouraged mother to continue to use the breastfeeding log while in the hospital and given Breastfeeding/Supplementation/Pumping Log to keep track of breastfeeding and milk amounts as she progresses.    Instructions given on pumping.  Discussed when to  start, frequency, different pumps available versus manual expression.    Discussed hygiene of hands and supplies as well as assembly, placement of flanges, size of flanged, preparing the breast and cycles and suction settings on pump.    Demonstrated use of hand pump.    Discussed labeling of milk, storage, and preparation of stored milk.    Mother met criteria for indication for use of nipple shield.  Discussed the benefits and risks associated with use of nipple shield.  Demonstrated application. Encouraged duration of use less than 7 days as the goal when utilizing this breastfeeding tool. Encouraged family to call with further concerns and questions. Encouraged follow up with lactation support outpatient if needing to use longer than 7 days to maintain breastfeeding/latches.       Encouraged parents to call for any lactation assistance, questions or concerns during their stay.         Starla Rosen RN 8/15/2024 12:33 PM

## 2024-08-15 NOTE — DISCHARGE SUMMARY
Discharge Summary - OB/GYN   Gladys Church 29 y.o. female MRN: [id number]  Unit/Bed#: -01 Encounter: [id number]      Admission Date: 2024     Discharge Date: 2024    Admitting Diagnosis:   Patient Active Problem List   Diagnosis    Chronic bilateral low back pain with bilateral sciatica    Asthma    Liver lesion    Blood pressure elevated without history of HTN    ASCUS with positive high risk HPV cervical     (spontaneous vaginal delivery)    Anemia, antepartum, third trimester    Pilonidal cyst    SROM vs PROM        Discharge Diagnosis:   Same, delivered      Procedures: spontaneous vaginal delivery    Delivery Attending: Cindi Li MD  Discharge Attending: Dr. Braswell    [facility] Course:   Gladys Church is a 29 y.o. now  at 40w3d wks who was initially admitted for premature rupture of membranes. Her pregnancy was complicated by anemia, asthma, and ASCUS. On presentation, she was noted to be grossly ruptured and SVE was 3/70/-3, unchanged from exam in the office a few days prior. She was admitted for SROM vs PROM as she was janett regularly. She did not make change on subsequent recheck and diagnosis of PROM was confirmed. Induction of labor was started with pitocin titration. She received an epidural. She progressed to complete and began pushing.     She delivered a viable male  on 24 at . Weight 7lbs 4.8oz via spontaneous vaginal delivery. Apgars were 8 (1 min) and 9 (5 min).  was transferred to  nursery. Patient tolerated the procedure well and was transferred to recovery in stable condition.     Her postpartum course was complicated by gestational hypertension diagnosed while in labor, progressing with normal BP ranges during postpartum. Her postpartum pain was well controlled with oral analgesics.    On day of discharge, she was ambulating and able to reasonably perform all ADLs. She was voiding and had appropriate bowel function. Pain was  Patient given copy of dc instructions and script(s). Patient verbalized understanding of instructions and script (s). Patient given a current medication reconciliation form and verbalized understanding of their medications. Patient verbalized understanding of the importance of discussing medications with  his or her physician or clinic they will be following up with. Patient alert and oriented and in no acute distress. Patient discharged home ambulatory with self. well controlled. She was discharged home on post-partum day #02 without complications. Patient was instructed to follow up with her OB as an outpatient and was given appropriate warnings to call provider if she develops signs of infection or uncontrolled pain.    Complications: none apparent    Condition at discharge: good     Discharge instructions/Information to patient and family:   - Do not place anything (no partner, tampons or douche) in your vagina for 6 weeks.  -You may walk for exercise for the first 6 weeks then gradually return to your usual activities.   -Please do not drive for 1 week if you have no stitches and for 2 weeks if you have stitches or underwent a  delivery.    -You may take baths or shower per your preference.   -Please look at your bust (breasts) in the mirror daily and call for redness or tenderness or increased warmth.   -Please call us for temperature > 100.4*F or 38* C, worsening pain or a foul discharge.      Discharge Medications:   Prenatal vitamin daily for 6 months or the duration of nursing whichever is longer.  Motrin 600 mg orally every 6 hours as needed for pain  Tylenol (over the counter) per bottle directions as needed for pain: do NOT use with percocet  Hydrocortisone cream 1% (over the counter) applied 1-2x daily to hemorrhoids as needed    Planned Readmission: No    Contraception: progestin only pills    Jenn Claudio  OB GYN PGY1  24  6:41 AM

## 2024-08-15 NOTE — PLAN OF CARE
Problem: PAIN - ADULT  Goal: Verbalizes/displays adequate comfort level or baseline comfort level  Description: Interventions:  - Encourage patient to monitor pain and request assistance  - Assess pain using appropriate pain scale  - Administer analgesics based on type and severity of pain and evaluate response  - Implement non-pharmacological measures as appropriate and evaluate response  - Consider cultural and social influences on pain and pain management  - Notify physician/advanced practitioner if interventions unsuccessful or patient reports new pain  Outcome: Progressing     Problem: INFECTION - ADULT  Goal: Absence or prevention of progression during hospitalization  Description: INTERVENTIONS:  - Assess and monitor for signs and symptoms of infection  - Monitor lab/diagnostic results  - Monitor all insertion sites, i.e. indwelling lines, tubes, and drains  - Monitor endotracheal if appropriate and nasal secretions for changes in amount and color  - [place] appropriate cooling/warming therapies per order  - Administer medications as ordered  - Instruct and encourage patient and family to use good hand hygiene technique  - Identify and instruct in appropriate isolation precautions for identified infection/condition  Outcome: Progressing  Goal: Absence of fever/infection during neutropenic period  Description: INTERVENTIONS:  - Monitor WBC    Outcome: Progressing     Problem: SAFETY ADULT  Goal: Patient will remain free of falls  Description: INTERVENTIONS:  - Educate patient/family on patient safety including physical limitations  - Instruct patient to call for assistance with activity   - Consult OT/PT to assist with strengthening/mobility   - Keep Call bell within reach  - Keep bed low and locked with side rails adjusted as appropriate  - Keep care items and personal belongings within reach  - Initiate and maintain comfort rounds  - Make Fall Risk Sign visible to staff  - Apply yellow socks and bracelet  for high fall risk patients  - Consider moving patient to room near nurses station  Outcome: Progressing  Goal: Maintain or return to baseline ADL function  Description: INTERVENTIONS:  -  Assess patient's ability to carry out ADLs; assess patient's baseline for ADL function and identify physical deficits which impact ability to perform ADLs (bathing, care of mouth/teeth, toileting, grooming, dressing, etc.)  - Assess/evaluate cause of self-care deficits   - Assess range of motion  - Assess patient's mobility; develop plan if impaired  - Assess patient's need for assistive devices and provide as appropriate  - Encourage maximum independence but intervene and supervise when necessary  - Involve family in performance of ADLs  - Assess for home care needs following discharge   - Consider OT consult to assist with ADL evaluation and planning for discharge  - Provide patient education as appropriate  Outcome: Progressing  Goal: Maintains/Returns to pre admission functional level  Description: INTERVENTIONS:  - Perform AM-PAC 6 Click Basic Mobility/ Daily Activity assessment daily.  - Set and communicate daily mobility goal to care team and patient/family/caregiver.   - Collaborate with rehabilitation services on mobility goals if consulted  - Out of bed for toileting  - Record patient progress and toleration of activity level   Outcome: Progressing     Problem: Knowledge Deficit  Goal: Patient/family/caregiver demonstrates understanding of disease process, treatment plan, medications, and discharge instructions  Description: Complete learning assessment and assess knowledge base.  Interventions:  - Provide teaching at level of understanding  - Provide teaching via preferred learning methods  Outcome: Progressing     Problem: DISCHARGE PLANNING  Goal: Discharge to home or other facility with appropriate resources  Description: INTERVENTIONS:  - Identify barriers to discharge w/patient and caregiver  - Arrange for needed  discharge resources and transportation as appropriate  - Identify discharge learning needs (meds, wound care, etc.)  - Arrange for interpretive services to assist at discharge as needed  - Refer to Case Management Department for coordinating discharge planning if the patient needs post-hospital services based on physician/advanced practitioner order or complex needs related to functional status, cognitive ability, or social support system  Outcome: Progressing

## 2024-08-15 NOTE — ANESTHESIA POSTPROCEDURE EVALUATION
"Post-Op Assessment Note    CV Status:  Stable    Pain management: adequate      Post-op block assessment: no complications and catheter intact   Mental Status:  Alert and awake   Hydration Status:  Euvolemic   PONV Controlled:  Controlled   Airway Patency:  Patent     Post Op Vitals Reviewed: Yes    No anethesia notable event occurred.    Staff: Anesthesiologist             /61   Pulse 81   Temp 98.7 °F (37.1 °C) (Oral)   Resp 16   Ht 5' 2\" (1.575 m)   Wt 69.9 kg (154 lb)   LMP 11/05/2023 (Exact Date)   SpO2 99%   Breastfeeding Unknown   BMI 28.17 kg/m²    BP      Temp      Pulse     Resp      SpO2        "

## 2024-08-15 NOTE — L&D DELIVERY NOTE
Vaginal Delivery Summary - OB/GYN   Gladys Church 29 y.o. female MRN: [id number]  Unit/Bed#: -01 Encounter: [id number]    Pre-delivery Diagnosis:   1. Pregnancy at 40w3d   2. Asthma  3. Anemia  4. ASCUS    Post-delivery Diagnosis: same, delivered    Procedure: Spontaneous Vaginal Delivery     Attending: Dr. Li    Assistant(s): Dr. Murdock    Anesthesia: Epidural    QBL: 211 mL    Complications: none apparent    Specimens:   1. Arterial and venous cord gases  2. Cord blood  3. Segment of umbilical cord  4. Placenta to storage     Findings:  1. Viable male on 2024 at , with APGARS of 8 and 9 at 1 and 5 minutes respectively,  2. Spontaneous delivery of intact placenta at   3. Second degree laceration, R labial laceration, and R vaginal laceration all repaired with 3-0 vicryl rapide  4. Umbilical Cord Venous Blood Gas:  Results from last 7 days   Lab Units 24   PH COV  7.377   PCO2 COV mm HG 30.6   HCO3 COV mmol/L 17.6   BASE EXC COV mmol/L -6.3*   O2 CT CD VB mL/dL 18.7   O2 HGB, VENOUS CORD % 93.7     5. Umbilical Cord Arterial Blood Gas:  Results from last 7 days   Lab Units 24   PH COA  7.275   PCO2 COA  48.2   PO2 COA mm HG 21.1   HCO3 COA mmol/L 21.9   BASE EXC COA mmol/L -5.2*   O2 CONTENT CORD ART ml/dl 9.0   O2 HGB, ARTERIAL CORD % 41.9       Disposition:  Patient tolerated the procedure well and was recovering in labor and delivery room.    Brief history and labor course:  Gladys Church is a 29 y.o. now  at 40w3d wks who was initially admitted for premature rupture of membranes. Her pregnancy was complicated by anemia, asthma, and ASCUS. On presentation, she was noted to be grossly ruptured and SVE was 3/70/-3, unchanged from exam in the office a few days prior. She was admitted for SROM vs PROM as she was janett regularly. She did not make change on subsequent recheck and diagnosis of PROM was confirmed. Induction of labor was started with pitocin  titration. She received an epidural. She progressed to complete and began pushing.     Description of procedure:  After pushing for 1 hour and 3 minutes, at  patient delivered a viable male , wt pending, apgars of 8 (1 min) and 9 (5 min). The fetal vertex delivered spontaneously. There was no nuchal cord. Baby was OA, restituted to ALBERT. The right anterior shoulder delivered atraumatically with maternal expulsive forces and the assistance of gentle downward traction. The left posterior shoulder delivered with maternal expulsive forces and the assistance of gentle upward traction. The remainder of the fetus delivered spontaneously.     Upon delivery, the infant was placed on the mothers abdomen and the cord was clamped and cut. The infant was noted to cry spontaneously and was moving all extremities appropriately. There was no evidence for injury. Awaiting nurse resuscitators evaluated the . Arterial and venous cord blood gases and cord blood was collected for analysis. These were promptly sent to the lab. In the immediate post-partum, 30 units of IV pitocin was administered, and the uterus was noted to contract down well with massage and pitocin. The placenta delivered spontaneously at  and was noted to have a centrally inserted 3 vessel cord.     The vagina, cervix, perineum, and rectum were inspected and there was noted to be a second degree perineal laceration, right labial laceration, and right vaginal mucosa laceration all requiring repair. Attention was first turned to the perineal laceration, which was repaired with 3-0 vicryl rapide. Under adequate anesthesia, the apex of the vaginal laceration was identified and an anchoring suture was placed 1 cm above the apex.  The vaginal mucosa and underlying rectovaginal fascia were closed using a running locked 3-0 Vicryl-CT 1 suture to the hymenal ring.  The suture was then brought underneath the hymenal ring.  The suture was then placed through  the bulbocavernosus muscle. Continuing with the same suture, the transverse perineal muscles were reapproximated with 2 transverse running sutures.  The suture was brought to the posterior apex of the skin laceration and then the skin was reapproximated in a subcuticular fashion to the hymenal ring. The right labial and right vaginal tears were subsequently repaired in a running locked fashion with 3-0 vicryl rapide. Excellent hemostasis was noted at all repair sites.    At the conclusion of the procedure, all needle, sponge, and instrument counts were noted to be correct. Patient tolerated the procedure well and was allowed to recover in labor and delivery room with family and  before being transferred to the post-partum floor.    Dr. Li was present and participated in all key portions of the case.    Pilar Murdock MD  OB/GYN PGY-2  2024  9:00 PM

## 2024-08-15 NOTE — PLAN OF CARE
Problem: BIRTH - VAGINAL/ SECTION  Goal: Fetal and maternal status remain reassuring during the birth process  Description: INTERVENTIONS:  - Monitor vital signs  - Monitor fetal heart rate  - Monitor uterine activity  - Monitor labor progression (vaginal delivery)  - DVT prophylaxis  - Antibiotic prophylaxis  Outcome: Completed  Goal: Emotionally satisfying birthing experience for mother/fetus  Description: Interventions:  - Assess, plan, implement and evaluate the nursing care given to the patient in labor  - Advocate the philosophy that each childbirth experience is a unique experience and support the family's chosen level of involvement and control during the labor process   - Actively participate in both the patient's and family's teaching of the birth process  - Consider cultural, [place] and age-specific factors and plan care for the patient in labor  Outcome: Completed     Problem: Knowledge Deficit  Goal: Verbalizes understanding of labor plan  Description: Assess patient/family/caregiver's baseline knowledge level and ability to understand information.  Provide education via patient/family/caregiver's preferred learning method at appropriate level of understanding.     1. Provide teaching at level of understanding.  2. Provide teaching via preferred learning method(s).  Outcome: Completed     Problem: Labor & Delivery  Goal: Manages discomfort  Description: Assess and monitor for signs and symptoms of discomfort.  Assess patient's pain level regularly and per hospital policy.  Administer medications as ordered. Support use of nonpharmacological methods to help control pain such as distraction, imagery, relaxation, and application of heat and cold.  Collaborate with interdisciplinary team and patient to determine appropriate pain management plan.    1. Include patient in decisions related to comfort.  2. Offer non-pharmacological pain management interventions.  3. Report ineffective pain  management to physician.  Outcome: Completed  Goal: Patient vital signs are stable  Description: 1. Assess vital signs - vaginal delivery.  Outcome: Completed     Problem: PAIN - ADULT  Goal: Verbalizes/displays adequate comfort level or baseline comfort level  Description: Interventions:  - Encourage patient to monitor pain and request assistance  - Assess pain using appropriate pain scale  - Administer analgesics based on type and severity of pain and evaluate response  - Implement non-pharmacological measures as appropriate and evaluate response  - Consider cultural and social influences on pain and pain management  - Notify physician/advanced practitioner if interventions unsuccessful or patient reports new pain  Outcome: Progressing     Problem: INFECTION - ADULT  Goal: Absence or prevention of progression during hospitalization  Description: INTERVENTIONS:  - Assess and monitor for signs and symptoms of infection  - Monitor lab/diagnostic results  - Monitor all insertion sites, i.e. indwelling lines, tubes, and drains  - Monitor endotracheal if appropriate and nasal secretions for changes in amount and color  - [place] appropriate cooling/warming therapies per order  - Administer medications as ordered  - Instruct and encourage patient and family to use good hand hygiene technique  - Identify and instruct in appropriate isolation precautions for identified infection/condition  Outcome: Progressing  Goal: Absence of fever/infection during neutropenic period  Description: INTERVENTIONS:  - Monitor WBC    Outcome: Progressing     Problem: SAFETY ADULT  Goal: Patient will remain free of falls  Description: INTERVENTIONS:  - Educate patient/family on patient safety including physical limitations  - Instruct patient to call for assistance with activity   - Consult OT/PT to assist with strengthening/mobility   - Keep Call bell within reach  - Keep bed low and locked with side rails adjusted as appropriate  - Keep  care items and personal belongings within reach  - Initiate and maintain comfort rounds  - Make Fall Risk Sign visible to staff  - Apply yellow socks and bracelet for high fall risk patients  - Consider moving patient to room near nurses station  Outcome: Progressing  Goal: Maintain or return to baseline ADL function  Description: INTERVENTIONS:  -  Assess patient's ability to carry out ADLs; assess patient's baseline for ADL function and identify physical deficits which impact ability to perform ADLs (bathing, care of mouth/teeth, toileting, grooming, dressing, etc.)  - Assess/evaluate cause of self-care deficits   - Assess range of motion  - Assess patient's mobility; develop plan if impaired  - Assess patient's need for assistive devices and provide as appropriate  - Encourage maximum independence but intervene and supervise when necessary  - Involve family in performance of ADLs  - Assess for home care needs following discharge   - Consider OT consult to assist with ADL evaluation and planning for discharge  - Provide patient education as appropriate  Outcome: Progressing  Goal: Maintains/Returns to pre admission functional level  Description: INTERVENTIONS:  - Perform AM-PAC 6 Click Basic Mobility/ Daily Activity assessment daily.  - Set and communicate daily mobility goal to care team and patient/family/caregiver.   - Collaborate with rehabilitation services on mobility goals if consulted  - Out of bed for toileting  - Record patient progress and toleration of activity level   Outcome: Progressing     Problem: Knowledge Deficit  Goal: Patient/family/caregiver demonstrates understanding of disease process, treatment plan, medications, and discharge instructions  Description: Complete learning assessment and assess knowledge base.  Interventions:  - Provide teaching at level of understanding  - Provide teaching via preferred learning methods  Outcome: Progressing     Problem: DISCHARGE PLANNING  Goal: Discharge  to home or other facility with appropriate resources  Description: INTERVENTIONS:  - Identify barriers to discharge w/patient and caregiver  - Arrange for needed discharge resources and transportation as appropriate  - Identify discharge learning needs (meds, wound care, etc.)  - Arrange for interpretive services to assist at discharge as needed  - Refer to Case Management Department for coordinating discharge planning if the patient needs post-hospital services based on physician/advanced practitioner order or complex needs related to functional status, cognitive ability, or social support system  Outcome: Progressing

## 2024-08-15 NOTE — PROGRESS NOTES
"Progress Note - OB/GYN   Gladys Church 29 y.o. female MRN: [id number]  Unit/Bed#:  422-01 Encounter: [id number]    Assessment:  Post partum Day #01 s/p , stable, baby in the room.     Plan:   (spontaneous vaginal delivery)  Assessment & Plan  Continue routine post partum care  Encourage ambulation  Encourage breastfeeding  Contraception: POPs  Anticipate discharge PPD 01 vs 02     ASCUS with positive high risk HPV cervical  Assessment & Plan  From Pap smear with PCP in 2023  Recommend colposcopy  2024 Colpo: ALLYSON 1  F/u outpatient    Blood pressure elevated without history of HTN  Assessment & Plan  \"Elevated BP\" at initial PNV noted to be 130/88  No actual elevated BP per criteria throughout pregnancy  Monitor BP closely  Systolic (24hrs), Av , Min:82 , Max:143   Diastolic (24hrs), Av, Min:50, Max:94       Asthma  Assessment & Plan  Avoid hemabate        Subjective/Objective   Chief Complaint:     Post delivery. Patient is doing well. Lochia WNL. Pain well controlled.     Subjective:     Pain: yes, cramping, improved with meds  Tolerating PO: yes  Voiding: yes  Flatus: yes  Ambulating: yes  Chest pain: no  Shortness of breath: no  Leg pain: no  Lochia: minimal    Objective:     Vitals: /94 (BP Location: Right arm)   Pulse 75   Temp 98 °F (36.7 °C) (Oral)   Resp 16   Ht 5' 2\" (1.575 m)   Wt 69.9 kg (154 lb)   LMP 2023 (Exact Date)   SpO2 97%   Breastfeeding No   BMI 28.17 kg/m²     I/O          0701   0700  0701  08/15 0700    Urine (mL/kg/hr)  2800 (1.7)    Blood  211    Total Output  3011    Net  -3011                  Lab Results   Component Value Date    WBC 11.17 (H) 2024    HGB 13.2 2024    HCT 39.0 2024    MCV 85 2024     2024       Physical Exam:     Gen: AAOx3, NAD  CV: no acute distress  Lungs: no acute distress  Abd: Soft, non-tender, non-distended, no rebound or guarding  Uterine fundus firm and " non-tender, 01 cm below the umbilicus.  Ext: Non tender    Jenn Ruiz MD  OBGYN PGY-1  8/15/2024  6:41 AM

## 2024-08-16 ENCOUNTER — TELEPHONE (OUTPATIENT)
Age: 29
End: 2024-08-16

## 2024-08-16 VITALS
BODY MASS INDEX: 28.34 KG/M2 | HEIGHT: 62 IN | DIASTOLIC BLOOD PRESSURE: 88 MMHG | RESPIRATION RATE: 18 BRPM | OXYGEN SATURATION: 100 % | HEART RATE: 63 BPM | WEIGHT: 154 LBS | SYSTOLIC BLOOD PRESSURE: 119 MMHG | TEMPERATURE: 97.7 F

## 2024-08-16 PROCEDURE — NC001 PR NO CHARGE: Performed by: OBSTETRICS & GYNECOLOGY

## 2024-08-16 PROCEDURE — 99024 POSTOP FOLLOW-UP VISIT: CPT | Performed by: OBSTETRICS & GYNECOLOGY

## 2024-08-16 RX ORDER — CALCIUM CARBONATE 500 MG/1
1000 TABLET, CHEWABLE ORAL 3 TIMES DAILY PRN
Start: 2024-08-16

## 2024-08-16 RX ORDER — SIMETHICONE 80 MG
80 TABLET,CHEWABLE ORAL 4 TIMES DAILY PRN
Start: 2024-08-16

## 2024-08-16 RX ORDER — ACETAMINOPHEN AND CODEINE PHOSPHATE 120; 12 MG/5ML; MG/5ML
1 SOLUTION ORAL DAILY
Qty: 28 TABLET | Refills: 2 | Status: SHIPPED | OUTPATIENT
Start: 2024-08-16 | End: 2024-11-08

## 2024-08-16 RX ORDER — BENZOCAINE/MENTHOL 6 MG-10 MG
1 LOZENGE MUCOUS MEMBRANE DAILY PRN
Start: 2024-08-16

## 2024-08-16 RX ORDER — ACETAMINOPHEN 500 MG
500 TABLET ORAL EVERY 6 HOURS PRN
Qty: 56 TABLET | Refills: 0 | Status: SHIPPED | OUTPATIENT
Start: 2024-08-16 | End: 2024-08-30

## 2024-08-16 RX ORDER — DIPHENHYDRAMINE HCL 25 MG
25 TABLET ORAL EVERY 6 HOURS PRN
Status: CANCELLED
Start: 2024-08-16

## 2024-08-16 RX ORDER — IBUPROFEN 200 MG
600 TABLET ORAL EVERY 8 HOURS PRN
Start: 2024-08-16 | End: 2024-08-30

## 2024-08-16 RX ORDER — MAGNESIUM HYDROXIDE/ALUMINUM HYDROXICE/SIMETHICONE 120; 1200; 1200 MG/30ML; MG/30ML; MG/30ML
15 SUSPENSION ORAL EVERY 6 HOURS PRN
Status: CANCELLED
Start: 2024-08-16

## 2024-08-16 RX ADMIN — IBUPROFEN 600 MG: 600 TABLET, FILM COATED ORAL at 14:06

## 2024-08-16 RX ADMIN — ACETAMINOPHEN 650 MG: 325 TABLET ORAL at 09:36

## 2024-08-16 RX ADMIN — DOCUSATE SODIUM 100 MG: 100 CAPSULE, LIQUID FILLED ORAL at 07:34

## 2024-08-16 RX ADMIN — ACETAMINOPHEN 650 MG: 325 TABLET ORAL at 03:34

## 2024-08-16 RX ADMIN — IBUPROFEN 600 MG: 600 TABLET, FILM COATED ORAL at 07:33

## 2024-08-16 RX ADMIN — SENNOSIDES 8.6 MG: 8.6 TABLET, FILM COATED ORAL at 07:33

## 2024-08-16 NOTE — TELEPHONE ENCOUNTER
Pt called while being DC from hospital to sched a BP check per provider was told to sched it for this upcoming Monday. Tried to transfer to practice to sched but no one answered was on hold for a while. Informed pt I would forward her information and a nurse will call her to sched the BP check.

## 2024-08-16 NOTE — NURSING NOTE
Patient refused discharge teaching at this time.  Requested to sleep and wait until the baby wakes up. Will attempt at a later time. SK

## 2024-08-16 NOTE — NURSING NOTE
Provided discharge teaching to patients satisfaction.  Patient had no further questions at this time. Maternal and  packets and save your life magnet were provided.

## 2024-08-16 NOTE — PROGRESS NOTES
"Progress Note - OB/GYN   Gladys Church 29 y.o. female MRN: [id number]  Unit/Bed#: -01 Encounter: [id number]    Assessment:  Post partum Day #02 s/p , stable, baby in the room.    Plan:   (spontaneous vaginal delivery)  Assessment & Plan  Continue routine post partum care  Encourage ambulation  Encourage breastfeeding  Contraception: POPs  Anticipate discharge PPD 02     ASCUS with positive high risk HPV cervical  Assessment & Plan  From Pap smear with PCP in 2023  Recommend colposcopy  2024 Colpo: ALLYSON 1  F/u outpatient    Blood pressure elevated without history of HTN  Assessment & Plan  \"Elevated BP\" at initial PNV noted to be 130/88  No actual elevated BP per criteria throughout pregnancy  Monitor BP closely  Systolic (24hrs), Av , Min:95 , Max:135   Diastolic (24hrs), Av, Min:63, Max:84       Asthma  Assessment & Plan  Avoid hemabate        Subjective/Objective   Chief Complaint:     Post delivery. Patient is doing well. Lochia WNL. Pain well controlled.     Subjective:     Pain: yes, cramping, improved with meds  Tolerating PO: yes  Voiding: yes  Flatus: yes  Ambulating: yes  Chest pain: no  Shortness of breath: no  Leg pain: no  Lochia: minimal    Objective:     Vitals: BP 95/65 (BP Location: Right arm)   Pulse 71   Temp 97.6 °F (36.4 °C) (Temporal)   Resp 18   Ht 5' 2\" (1.575 m)   Wt 69.9 kg (154 lb)   LMP 2023 (Exact Date)   SpO2 100%   Breastfeeding Yes   BMI 28.17 kg/m²     I/O          0701  08/15 0700 08/15 07 0700    Urine (mL/kg/hr) 2800 (1.7)     Blood 211     Total Output 3011     Net -3011                   Lab Results   Component Value Date    WBC 11.17 (H) 2024    HGB 13.2 2024    HCT 39.0 2024    MCV 85 2024     2024       Physical Exam:     Gen: AAOx3, NAD  CV: no acute distress  Lungs: no acute distress  Abd: Soft, non-tender, non-distended, no rebound or guarding  Uterine fundus firm and " non-tender, 02 cm below the umbilicus.  Ext: Non tender    Jenn Ruiz MD  OBGYN PGY-1  8/16/2024  6:37 AM

## 2024-08-16 NOTE — PLAN OF CARE
Problem: PAIN - ADULT  Goal: Verbalizes/displays adequate comfort level or baseline comfort level  Description: Interventions:  - Encourage patient to monitor pain and request assistance  - Assess pain using appropriate pain scale  - Administer analgesics based on type and severity of pain and evaluate response  - Implement non-pharmacological measures as appropriate and evaluate response  - Consider cultural and social influences on pain and pain management  - Notify physician/advanced practitioner if interventions unsuccessful or patient reports new pain  Outcome: Progressing     Problem: INFECTION - ADULT  Goal: Absence or prevention of progression during hospitalization  Description: INTERVENTIONS:  - Assess and monitor for signs and symptoms of infection  - Monitor lab/diagnostic results  - Monitor all insertion sites, i.e. indwelling lines, tubes, and drains  - Monitor endotracheal if appropriate and nasal secretions for changes in amount and color  - [place] appropriate cooling/warming therapies per order  - Administer medications as ordered  - Instruct and encourage patient and family to use good hand hygiene technique  - Identify and instruct in appropriate isolation precautions for identified infection/condition  Outcome: Progressing  Goal: Absence of fever/infection during neutropenic period  Description: INTERVENTIONS:  - Monitor WBC    Outcome: Progressing     Problem: SAFETY ADULT  Goal: Patient will remain free of falls  Description: INTERVENTIONS:  - Educate patient/family on patient safety including physical limitations  - Instruct patient to call for assistance with activity   - Consult OT/PT to assist with strengthening/mobility   - Keep Call bell within reach  - Keep bed low and locked with side rails adjusted as appropriate  - Keep care items and personal belongings within reach  - Initiate and maintain comfort rounds  - Make Fall Risk Sign visible to staff  - Apply yellow socks and bracelet  for high fall risk patients  - Consider moving patient to room near nurses station  Outcome: Progressing  Goal: Maintain or return to baseline ADL function  Description: INTERVENTIONS:  -  Assess patient's ability to carry out ADLs; assess patient's baseline for ADL function and identify physical deficits which impact ability to perform ADLs (bathing, care of mouth/teeth, toileting, grooming, dressing, etc.)  - Assess/evaluate cause of self-care deficits   - Assess range of motion  - Assess patient's mobility; develop plan if impaired  - Assess patient's need for assistive devices and provide as appropriate  - Encourage maximum independence but intervene and supervise when necessary  - Involve family in performance of ADLs  - Assess for home care needs following discharge   - Consider OT consult to assist with ADL evaluation and planning for discharge  - Provide patient education as appropriate  Outcome: Progressing  Goal: Maintains/Returns to pre admission functional level  Description: INTERVENTIONS:  - Perform AM-PAC 6 Click Basic Mobility/ Daily Activity assessment daily.  - Set and communicate daily mobility goal to care team and patient/family/caregiver.   - Collaborate with rehabilitation services on mobility goals if consulted  - Out of bed for toileting  - Record patient progress and toleration of activity level   Outcome: Progressing     Problem: Knowledge Deficit  Goal: Patient/family/caregiver demonstrates understanding of disease process, treatment plan, medications, and discharge instructions  Description: Complete learning assessment and assess knowledge base.  Interventions:  - Provide teaching at level of understanding  - Provide teaching via preferred learning methods  Outcome: Progressing     Problem: DISCHARGE PLANNING  Goal: Discharge to home or other facility with appropriate resources  Description: INTERVENTIONS:  - Identify barriers to discharge w/patient and caregiver  - Arrange for needed  discharge resources and transportation as appropriate  - Identify discharge learning needs (meds, wound care, etc.)  - Arrange for interpretive services to assist at discharge as needed  - Refer to Case Management Department for coordinating discharge planning if the patient needs post-hospital services based on physician/advanced practitioner order or complex needs related to functional status, cognitive ability, or social support system  Outcome: Progressing

## 2024-08-16 NOTE — LACTATION NOTE
This note was copied from a baby's chart.    In to see family today. Discussed risks for early supplementation: over feeding, longer digestion times, engorgement for mom, lower milk supply for mom, and nipple confusion.     Benefits of breast feeding for infant's intestinal tract, less engorgement for mom, protection from multiple disease processes as infant develops, avoidance of over feeding for infant, less nipple confusion, and increased health benefits for mom. Review of positioning and alignment.   Mom is encouraged to:     - Bring baby up to the breast (use of pillows to elevate so baby's torso is against mom's breasts)   - Skin to skin for feedings with top hand exposed to show signs of satiation   - Chin deep into breast tissue (make baby look up to the nipple)   - nose aligned to the nipple   -Wait for wide gape, drag chin on the breast so nipple is aimed at the upper, back palate  - Cheek should be touching breast   - Deep, firm hold of baby with ear, shoulder, hip alignment     08/16/24 0930   Maternal Information   Has mother  before? No   Infant to breast within first hour of birth? Yes   Exclusive Pump and Bottle Feed No   LATCH Documentation   Having latch problems?   (Baby learning to latch; encouraged Lactation support)   Breasts/Nipples   Intervention Other (comment)  (Risks of early supplement; review good latch/feed & support availablle)   Breastfeeding Progress Not yet established   Other OB Lactation Tools   Feeding Devices Bottle;Nipple Shield(s);Pump;Syringe   Breast Pump   Pump 2;1;3   Pump Review/Education Milk storage   Patient Follow-Up   Lactation Consult Status 2   Follow-Up Type Inpatient;Call as needed   Other OB Lactation Documentation    Additional Problem Noted Encouraged Mom to call for lactation support  (has BOTH Booklets)     Encouraged parents to call for assistance, questions, and concerns about breastfeeding.  Extension provided.

## 2024-08-16 NOTE — PLAN OF CARE
Problem: PAIN - ADULT  Goal: Verbalizes/displays adequate comfort level or baseline comfort level  Description: Interventions:  - Encourage patient to monitor pain and request assistance  - Assess pain using appropriate pain scale  - Administer analgesics based on type and severity of pain and evaluate response  - Implement non-pharmacological measures as appropriate and evaluate response  - Consider cultural and social influences on pain and pain management  - Notify physician/advanced practitioner if interventions unsuccessful or patient reports new pain  Outcome: Progressing     Problem: INFECTION - ADULT  Goal: Absence or prevention of progression during hospitalization  Description: INTERVENTIONS:  - Assess and monitor for signs and symptoms of infection  - Monitor lab/diagnostic results  - Monitor all insertion sites, i.e. indwelling lines, tubes, and drains  - Monitor endotracheal if appropriate and nasal secretions for changes in amount and color  - [place] appropriate cooling/warming therapies per order  - Administer medications as ordered  - Instruct and encourage patient and family to use good hand hygiene technique  - Identify and instruct in appropriate isolation precautions for identified infection/condition  Outcome: Progressing  Goal: Absence of fever/infection during neutropenic period  Description: INTERVENTIONS:  - Monitor WBC    Outcome: Progressing     Problem: SAFETY ADULT  Goal: Patient will remain free of falls  Description: INTERVENTIONS:  - Educate patient/family on patient safety including physical limitations  - Instruct patient to call for assistance with activity   - Consult OT/PT to assist with strengthening/mobility   - Keep Call bell within reach  - Keep bed low and locked with side rails adjusted as appropriate  - Keep care items and personal belongings within reach  - Initiate and maintain comfort rounds  - Make Fall Risk Sign visible to staff  - Offer Toileting every  Hours,  in advance of need  - Initiate/Maintain alarm  - Obtain necessary fall risk management equipment:   - Apply yellow socks and bracelet for high fall risk patients  - Consider moving patient to room near nurses station  Outcome: Progressing  Goal: Maintain or return to baseline ADL function  Description: INTERVENTIONS:  -  Assess patient's ability to carry out ADLs; assess patient's baseline for ADL function and identify physical deficits which impact ability to perform ADLs (bathing, care of mouth/teeth, toileting, grooming, dressing, etc.)  - Assess/evaluate cause of self-care deficits   - Assess range of motion  - Assess patient's mobility; develop plan if impaired  - Assess patient's need for assistive devices and provide as appropriate  - Encourage maximum independence but intervene and supervise when necessary  - Involve family in performance of ADLs  - Assess for home care needs following discharge   - Consider OT consult to assist with ADL evaluation and planning for discharge  - Provide patient education as appropriate  Outcome: Progressing  Goal: Maintains/Returns to pre admission functional level  Description: INTERVENTIONS:  - Perform AM-PAC 6 Click Basic Mobility/ Daily Activity assessment daily.  - Set and communicate daily mobility goal to care team and patient/family/caregiver.   - Collaborate with rehabilitation services on mobility goals if consulted  - Perform Range of Motion  times a day.  - Reposition patient every  hours.  - Dangle patient  times a day  - Stand patient  times a day  - Ambulate patient  times a day  - Out of bed to chair  times a day   - Out of bed for meals  times a day  - Out of bed for toileting  - Record patient progress and toleration of activity level   Outcome: Progressing     Problem: Knowledge Deficit  Goal: Patient/family/caregiver demonstrates understanding of disease process, treatment plan, medications, and discharge instructions  Description: Complete learning  assessment and assess knowledge base.  Interventions:  - Provide teaching at level of understanding  - Provide teaching via preferred learning methods  Outcome: Progressing     Problem: DISCHARGE PLANNING  Goal: Discharge to home or other facility with appropriate resources  Description: INTERVENTIONS:  - Identify barriers to discharge w/patient and caregiver  - Arrange for needed discharge resources and transportation as appropriate  - Identify discharge learning needs (meds, wound care, etc.)  - Arrange for interpretive services to assist at discharge as needed  - Refer to Case Management Department for coordinating discharge planning if the patient needs post-hospital services based on physician/advanced practitioner order or complex needs related to functional status, cognitive ability, or social support system  Outcome: Progressing

## 2024-08-16 NOTE — PLAN OF CARE
Problem: PAIN - ADULT  Goal: Verbalizes/displays adequate comfort level or baseline comfort level  Description: Interventions:  - Encourage patient to monitor pain and request assistance  - Assess pain using appropriate pain scale  - Administer analgesics based on type and severity of pain and evaluate response  - Implement non-pharmacological measures as appropriate and evaluate response  - Consider cultural and social influences on pain and pain management  - Notify physician/advanced practitioner if interventions unsuccessful or patient reports new pain  8/16/2024 1428 by Yany Hicks RN  Outcome: Adequate for Discharge     Problem: INFECTION - ADULT  Goal: Absence or prevention of progression during hospitalization  Description: INTERVENTIONS:  - Assess and monitor for signs and symptoms of infection  - Monitor lab/diagnostic results  - Monitor all insertion sites, i.e. indwelling lines, tubes, and drains  - Monitor endotracheal if appropriate and nasal secretions for changes in amount and color  - [place] appropriate cooling/warming therapies per order  - Administer medications as ordered  - Instruct and encourage patient and family to use good hand hygiene technique  - Identify and instruct in appropriate isolation precautions for identified infection/condition  8/16/2024 1428 by Yany Hicks RN  Outcome: Adequate for Discharge     Problem: INFECTION - ADULT  Goal: Absence of fever/infection during neutropenic period  Description: INTERVENTIONS:  - Monitor WBC    8/16/2024 1428 by Yany Hicks RN  Outcome: Adequate for Discharge     Problem: SAFETY ADULT  Goal: Patient will remain free of falls  Description: INTERVENTIONS:  - Educate patient/family on patient safety including physical limitations  - Instruct patient to call for assistance with activity   - Consult OT/PT to assist with strengthening/mobility   - Keep Call bell within reach  - Keep bed low and locked with side rails adjusted as  appropriate  - Keep care items and personal belongings within reach  - Initiate and maintain comfort rounds  - Make Fall Risk Sign visible to staff  - Apply yellow socks and bracelet for high fall risk patients  - Consider moving patient to room near nurses station  8/16/2024 1428 by Yany Hicks RN  Outcome: Adequate for Discharge     Problem: SAFETY ADULT  Goal: Maintain or return to baseline ADL function  Description: INTERVENTIONS:  -  Assess patient's ability to carry out ADLs; assess patient's baseline for ADL function and identify physical deficits which impact ability to perform ADLs (bathing, care of mouth/teeth, toileting, grooming, dressing, etc.)  - Assess/evaluate cause of self-care deficits   - Assess range of motion  - Assess patient's mobility; develop plan if impaired  - Assess patient's need for assistive devices and provide as appropriate  - Encourage maximum independence but intervene and supervise when necessary  - Involve family in performance of ADLs  - Assess for home care needs following discharge   - Consider OT consult to assist with ADL evaluation and planning for discharge  - Provide patient education as appropriate  8/16/2024 1428 by Yany Hicks RN  Outcome: Adequate for Discharge     Problem: SAFETY ADULT  Goal: Maintains/Returns to pre admission functional level  Description: INTERVENTIONS:  - Perform AM-PAC 6 Click Basic Mobility/ Daily Activity assessment daily.  - Set and communicate daily mobility goal to care team and patient/family/caregiver.   - Collaborate with rehabilitation services on mobility goals if consulted  - Out of bed for toileting  - Record patient progress and toleration of activity level   8/16/2024 1428 by Yany Hicks RN  Outcome: Adequate for Discharge     Problem: Knowledge Deficit  Goal: Patient/family/caregiver demonstrates understanding of disease process, treatment plan, medications, and discharge instructions  Description: Complete learning  assessment and assess knowledge base.  Interventions:  - Provide teaching at level of understanding  - Provide teaching via preferred learning methods  8/16/2024 1428 by Yany Hicks RN  Outcome: Adequate for Discharge     Problem: DISCHARGE PLANNING  Goal: Discharge to home or other facility with appropriate resources  Description: INTERVENTIONS:  - Identify barriers to discharge w/patient and caregiver  - Arrange for needed discharge resources and transportation as appropriate  - Identify discharge learning needs (meds, wound care, etc.)  - Arrange for interpretive services to assist at discharge as needed  - Refer to Case Management Department for coordinating discharge planning if the patient needs post-hospital services based on physician/advanced practitioner order or complex needs related to functional status, cognitive ability, or social support system  8/16/2024 1428 by Yany Hicks RN  Outcome: Adequate for Discharge

## 2024-08-16 NOTE — TELEPHONE ENCOUNTER
Called patient back and scheduled her a postpartum and blood pressure check for 8/19/24 with Dr. Ernst.

## 2024-08-19 ENCOUNTER — TELEPHONE (OUTPATIENT)
Dept: OBGYN CLINIC | Facility: CLINIC | Age: 29
End: 2024-08-19

## 2024-08-19 ENCOUNTER — POSTPARTUM VISIT (OUTPATIENT)
Dept: OBGYN CLINIC | Facility: CLINIC | Age: 29
End: 2024-08-19

## 2024-08-19 VITALS
HEART RATE: 99 BPM | DIASTOLIC BLOOD PRESSURE: 80 MMHG | OXYGEN SATURATION: 98 % | WEIGHT: 142.6 LBS | HEIGHT: 62 IN | SYSTOLIC BLOOD PRESSURE: 128 MMHG | BODY MASS INDEX: 26.24 KG/M2

## 2024-08-19 PROCEDURE — 99024 POSTOP FOLLOW-UP VISIT: CPT | Performed by: OBSTETRICS & GYNECOLOGY

## 2024-08-19 NOTE — TELEPHONE ENCOUNTER
"POSTPARTUM PHONE CALL ASSESSMENT    Date of Delivery: 2024  Delivering Provider: Dr. Ernst  Mode:   Delivery Notes/Complications: No complications   Do you still have bleeding? yes  If so, how much/how severe? moderate bleeding  Do you have any pain? no  If so, how much/how severe?  States stitches are uncomfortable but \"otherwise\" denies pain  Regular BMs/Urination? Patient is urinating.  Is complaining of constipation.  States she got Colace today and took one dose.  Will monitor and contact office if no improvement.   Breastfeeding/Formula/Both? Breast and bottle feeding  How are you doing emotionally? Patient states \"I am doing okay, just tired\".    EPDS Score: 9  Do you have any other questions or concerns for us or your provider? no   Have you scheduled the pediatrician appointment with pediatrician? yes  Do you have a postpartum visit scheduled? yes   Date scheduled: 2024 Provider:  Dr. Ernst   "

## 2024-08-19 NOTE — PROGRESS NOTES
"Subjective    Patient is a 28 yo  who presents today s/p  on 24. She reports some general body aches in the morning. She sustained a 2nd degree laceration, right labial and vaginal lacerations during delivery which were adequately repaired. She reports some mild burning by her stitches. During her labor course, she was also diagnosed with gestational hypertension. She denies any signs/symptoms of preeclampsia today. She reports she is pumping and formula feeding, which is going well.    OB History          1    Para   1    Term   1       0    AB   0    Living   1         SAB   0    IAB   0    Ectopic   0    Multiple   0    Live Births   1           Obstetric Comments   Menarche 12                      Objective    Vitals:    24 0834   BP: 128/80   BP Location: Right arm   Patient Position: Sitting   Cuff Size: Standard   Pulse: 99   SpO2: 98%   Weight: 64.7 kg (142 lb 9.6 oz)   Height: 5' 2\" (1.575 m)        Physical Exam  Constitutional:       Appearance: Normal appearance.   Genitourinary:      Genitourinary Comments: Perineum and right labia intact   HENT:      Head: Normocephalic and atraumatic.   Cardiovascular:      Rate and Rhythm: Normal rate.   Pulmonary:      Effort: Pulmonary effort is normal. No respiratory distress.   Abdominal:      Palpations: Abdomen is soft.      Tenderness: There is no abdominal tenderness.   Musculoskeletal:         General: Normal range of motion.   Neurological:      Mental Status: She is alert.   Skin:     General: Skin is warm and dry.          Assessment    Patient Active Problem List   Diagnosis    Chronic bilateral low back pain with bilateral sciatica    Asthma    Liver lesion    Blood pressure elevated without history of HTN    ASCUS with positive high risk HPV cervical     (spontaneous vaginal delivery)    Anemia, antepartum, third trimester    Pilonidal cyst    SROM vs PROM        Plan  - Perineum intact  - BP normotensive  - Return " for next scheduled postpartum visit

## 2024-08-22 LAB — PLACENTA IN STORAGE: NORMAL

## 2024-08-27 ENCOUNTER — OFFICE VISIT (OUTPATIENT)
Dept: POSTPARTUM | Facility: CLINIC | Age: 29
End: 2024-08-27
Payer: COMMERCIAL

## 2024-08-27 PROCEDURE — 99404 PREV MED CNSL INDIV APPRX 60: CPT | Performed by: PEDIATRICS

## 2024-08-27 NOTE — PATIENT INSTRUCTIONS
"Continue to spend time skin to skin with Aziel and attempt direct breastfeeding when you desire to do so.  Continue pumping as often as you find sustainable.  Pumping at least once during the night can help improve milk production.  If you desire to provide more breastmilk, try pumping a little more frequently.  Reach out to [employer] to resolve the insurance issues so you can obtain a high quality pump.  When pumping, cycle your pump through stimulation and expression mode several times in a session to stimulate several let downs until you have expressed enough milk to feed the baby and to achieve breast comfort.  There is no need to \"empty\" the breast completely. Use gentle hands on pumping and hand expression   Maintain your pump as recommended. Use flange that fits comfortably and allows the breast to empty effectively.  Try a 19mm flange or insert to improve fit and comfort.  Follow up with Dr Aguilar as scheduled.  Please call with any questions or concerns.     "

## 2024-08-27 NOTE — PROGRESS NOTES
INITIAL BREAST FEEDING EVALUATION    Informant/Relationship: Gladys    Discussion of General Lactation Issues: Sage has had trouble latching since birth.  When he does latch, it is painful.  She is exclusively pumping at this time.  She is supplementing with formula.  She would like to breastfeed at least for some feedings.    Infant is 13 days old today.        History:  Fertility Problem:no  Breast changes:yes - breasts were tender early and wood,  areolas were larger and darker, prominent veining  : yes - labor augmented with pitocin after ROM  Full term:yes - 40 3/7 weeks   labor:no  First nursing/attempt < 1 hour after birth:yes - baby latched in the delivery room but he popped off every few sucks  Skin to skin following delivery:yes - in the delivery room  Breast changes after delivery:yes - milk was in by day 4-5  Rooming in (infant in room with mother with exception of procedures, eg. Circumcision:  yes  Blood sugar issues:no  NICU stay:no  Jaundice:no  Phototherapy:no  Supplement given: (list supplement and method used as well as reason(s):yes - formula via bottle due to latch issues.    Past Medical History:   Diagnosis Date    ASCUS of cervix with negative high risk HPV 12/15/2023    Asthma     Dysmenorrhea     Varicella          Current Outpatient Medications:     acetaminophen (TYLENOL) 500 mg tablet, Take 1 tablet (500 mg total) by mouth every 6 (six) hours as needed for mild pain or moderate pain for up to 14 days, Disp: 56 tablet, Rfl: 0    benzocaine-menthol-lanolin-aloe (DERMOPLAST) 20-0.5 % topical spray, Apply 1 Application topically every 6 (six) hours as needed for mild pain or irritation, Disp: , Rfl:     calcium carbonate (TUMS) 500 mg chewable tablet, Chew 2 tablets (1,000 mg total) 3 (three) times a day as needed for indigestion or heartburn (Patient not taking: Reported on 2024), Disp: , Rfl:     ferrous gluconate (FERGON) 324 mg tablet, Take 324 mg by mouth  daily with breakfast, Disp: , Rfl:     hydrocortisone 1 % cream, Apply 1 Application topically daily as needed for irritation or rash (Patient not taking: Reported on 8/19/2024), Disp: , Rfl:     ibuprofen (MOTRIN) 200 mg tablet, Take 3 tablets (600 mg total) by mouth every 8 (eight) hours as needed for mild pain or moderate pain for up to 14 days, Disp: , Rfl:     norethindrone (Ortho Micronor) 0.35 MG tablet, Take 1 tablet (0.35 mg total) by mouth daily, Disp: 28 tablet, Rfl: 2    Prenatal MV-Min-Fe Fum-FA-DHA (Prenatal+DHA) 28-0.975 & 200 MG MISC, Take 1 each by mouth daily, Disp: 90 each, Rfl: 0    simethicone (MYLICON) 80 mg chewable tablet, Chew 1 tablet (80 mg total) 4 (four) times a day as needed for flatulence, Disp: , Rfl:     witch hazel-glycerin (TUCKS) topical pad, Apply 1 Pad topically every 4 (four) hours as needed for irritation, Disp: , Rfl:     Allergies   Allergen Reactions    Cat Hair Extract     Dust Mite Extract     Other Allergic Rhinitis       Social History     Substance and Sexual Activity   Drug Use Not Currently    Types: Marijuana    Comment: last use in December 2023       Social History Never a smoker    Interval Breastfeeding History:  Frequency of breast feeding: has not attempted since discharge from the hospital  Does mother feel breastfeeding is effective: No  Does infant appear satisfied after nursing:No  Stooling pattern normal: Yes  Urinating frequently:Yes  Using shield or shells: Yes, tried in the hospital.  Did not help    Alternative/Artificial Feedings:   Bottle: Yes, Sage is exclusively bottle fed at this time.  Using Dr Overtons and Keith Jane bottles using paced feeding technique.  Cup: No  Syringe/Finger: No           Formula Type: Enfamil Gentlease                     Amount: 2-3 ounces when expressed milk is not available (all but 3-4 feedings)            Breast Milk:                      Amount: 2-3 ounces 3-4 times a day            Frequency Q 2-3 Hr between  feedings  Elimination Problems: No      Equipment:  Nipple Shield             Type: none currently             Size: n/a             Frequency of Use: n/a  Pump            Type: Tsrete S12 wearable            Frequency of Use: At most 4 times a day.  Does not pump for 12-13 hours over night  Shells            Type: none            Frequency of use: n/a    Equipment Problems: yes pumping is painful    Mom:  Breast: Medium sized symmetrical breast.  Rounded shape.  Closely spaced.   Nipple Assessment in General: Flat, dimpled nipples katie easily with stimulation.  Mother's Awareness of Feeding Cues                 Recognizes: Yes                  Verbalizes: Yes  Support System: FOB, extended family  History of Breastfeeding: none  Changes/Stressors/Violence: Gladys is concerned that Sage does not latch or nurse effectively. She is content with where her milk production  Concerns/Goals: Gladys desires to directly breastfeed.    Problems with Mom: pain when pumping.     Physical Exam  Constitutional:       Appearance: Normal appearance.   HENT:      Head: Normocephalic and atraumatic.      Nose: Nose normal.   Pulmonary:      Effort: Pulmonary effort is normal.   Musculoskeletal:         General: Normal range of motion.      Cervical back: Normal range of motion and neck supple.   Neurological:      Mental Status: She is alert and oriented to person, place, and time.   Skin:     General: Skin is warm and dry.   Psychiatric:         Mood and Affect: Mood normal.         Behavior: Behavior normal.         Thought Content: Thought content normal.         Judgment: Judgment normal.         Infant:  Behaviors: Alert  Color: Normal  Birth weight: 3310 grams  Current weight: 3460 grams    Problems with infant: not latching or nursing effectively      General Appearance:  Alert, active, no distress                             Head:  Normocephalic, AFOF, sutures opposed                             Eyes:  Conjunctiva clear, no  drainage                              Ears:  Normally placed, no anomolies                             Nose:  No drainage or erythema                           Mouth:  No lesions. Tongue extends just barely to the lower lip, some lateralization is noted but the tongue remains completely flat when crying.  There is slight distortion of the tip of the tongue and a deep groove down the center of the tongue with movement.  Poor cupping of my finger felt as he sucked and no effective peristalsis.  The back of the tongue just compressed my finger against the palate.  The lingual frenulum is thin but very short.  It attaches just posterior to the tip of the tongue and on the body of the lower alveolar ridge.  Passive lift of the tongue is limited and when lifting the tongue, the lower jaw is pulled up as well.                    Neck:  Slight head tilt left ear to left shoulder, symmetrical, trachea midline                 Respiratory:  No grunting, flaring, retractions, breath sounds clear and equal            Cardiovascular:  Regular rate and rhythm. No murmur. Adequate perfusion/capillary refill. Femoral pulse present                    Abdomen:   Soft, non-tender, no masses, bowel sounds present, no HSM             Genitourinary:  Normal male, testes descended, no discharge, swelling, or pain, anus patent                          Spine:   No abnormalities noted        Musculoskeletal:  Increased tension noted in the shoulders, back and hips.  There is a large, well defined lump on the right clavicle midway between the sternal notch and the shoulder          Skin/Hair/Nails:   Skin warm, dry, and intact, no rashes or abnormal dyspigmentation or lesions                Neurologic:   No abnormal movement, tone appropriate for gestational age    [place] Latch:  Efficiency:               Lips Flanged: on the bottle, the upper lip curled under tightly, the lower lip flanged.  There was no latch on the breast               Depth of latch: shallow on the bottle teat initially, deeper after education. No latch on the breast              Audible Swallow: Yes, when bottle feeding              Visible Milk: Yes              Wide Open/ Asymmetrical: on the bottle teat              Suck Swallow Cycle: Breathing: unlabored, Coordinated: yes  Nipple Assessment after latch: n/a bottle fed  Latch Problems: Sage did not latch on the breast.  When offered the bottle, he latched only on the very tip of the nipple.  His upper lip curled under tightly as he fed    Position:  Infant's Ergonomics/Body               Body Alignment: Yes, after education               Head Supported: Yes               Close to Mom's body/ Lifted/ Supported: Yes               Mom's Ergonomics/Body: Yes                           Supported: Yes                           Sitting Back: Yes                           Brings Baby to her breast: Yes  Positioning Problems: Gladys positioned Sage in football hold on his back under the breast with his chin tipped onto his chest.      Handouts:   Paced bottle feeding, Hands on pumping, Hand expression, Latch Check List, and Tongue Tie    Education:  Reviewed Latch: Demonstrated how to gently compress the breast and align the baby so that his nose is just above the nipple with his lower lip and chin touching the breast to encourage the deepest, widest, off-center latch. Discussed why Sage is having trouble latching and nursing effectively  Reviewed Positioning for Dyad: Demonstrated how to position baby belly to belly with mom with his ear shoulder and hip in alignment.  Reviewed Frequency/Supply & Demand: Discussed how milk production is established and regulated.  Reviewed Alternative/Artificial Feedings: Discussed and demonstrated paced bottle feeding.  Reviewed Mom/Breast care: demonstrated reverse pressure softening and a nipple roll out  Reviewed Equipment: Discussed and demonstrated the use and features of Gladys's pump and  how to determine what size flange to use.      Plan:    Reassurance and support given.  I acknowledged Gladys's concerns and her desire to breastfeed.  I encouraged her to spend time skin to skin with Sage and to attempt direct breastfeeding as often as she desires.  I encouraged her to keep pumping as often as she finds sustainable as long as she desires to provide milk for her baby.  I explained the infrequent pumping with long intervals between pumping sessions at night will likely not result in full production and production may continue to drop over time.  She was shown how to use her pump effectively.  I suggested smaller flanges to improve fit.  I encouraged her to follow through with [employer] to obtain a high quality pump through her insurance.  An appointment was scheduled with Dr Aguilar for more evaluation of Sage's feeding challenges.  I encouraged Gladys to call with any questions or concerns.    I have spent 90 minutes with Patient and family today in which greater than 50% of this time was spent in counseling/coordination of care regarding Patient and family education and Counseling / Coordination of care.

## 2024-08-31 NOTE — PROGRESS NOTES
I have reviewed the notes, assessments, and/or procedures performed by Alysa Null RN, IBCLC, I concur with her/his documentation of Gladys Aguilar MD 08/30/24

## 2024-09-02 NOTE — PROGRESS NOTES
"Subjective    Patient is a 30 yo  who presents today s/p  on 24. She sustained a 2nd degree laceration, right labial and vaginal lacerations during delivery which were adequately repaired. During her labor course, she was also diagnosed with gestational hypertension. She is currently pumping, which is going well. She has noticed that the stiches are still in place, but don't bother her.    OB History          1    Para   1    Term   1       0    AB   0    Living   1         SAB   0    IAB   0    Ectopic   0    Multiple   0    Live Births   1           Obstetric Comments   Menarche 12                      Objective    Vitals:    24 1339   BP: 110/74   BP Location: Right arm   Patient Position: Sitting   Cuff Size: Standard   Pulse: (!) 106   SpO2: 98%   Weight: 60.8 kg (134 lb)   Height: 5' 2\" (1.575 m)        Physical Exam  Constitutional:       Appearance: She is well-developed.   Genitourinary:      Vulva normal.      Genitourinary Comments: Perineum intact; right labial sutures in place      No vaginal discharge.   Cardiovascular:      Rate and Rhythm: Normal rate.   Pulmonary:      Effort: Pulmonary effort is normal. No respiratory distress.   Abdominal:      Palpations: Abdomen is soft.      Tenderness: There is no abdominal tenderness.   Skin:     General: Skin is warm and dry.          Assessment    Patient Active Problem List   Diagnosis    Chronic bilateral low back pain with bilateral sciatica    Asthma    Liver lesion    Blood pressure elevated without history of HTN    ASCUS with positive high risk HPV cervical     (spontaneous vaginal delivery)    Anemia, antepartum, third trimester    Pilonidal cyst    SROM vs PROM        Plan   - EPDS 9  - Micronor for contraception  - Return for annual exam  - Perineum intact; right labial sutures still in place, patient declines removal today  "

## 2024-09-06 ENCOUNTER — POSTPARTUM VISIT (OUTPATIENT)
Dept: OBGYN CLINIC | Facility: CLINIC | Age: 29
End: 2024-09-06

## 2024-09-06 VITALS
HEART RATE: 106 BPM | BODY MASS INDEX: 24.66 KG/M2 | DIASTOLIC BLOOD PRESSURE: 74 MMHG | HEIGHT: 62 IN | WEIGHT: 134 LBS | SYSTOLIC BLOOD PRESSURE: 110 MMHG | OXYGEN SATURATION: 98 %

## 2024-09-06 PROCEDURE — 99024 POSTOP FOLLOW-UP VISIT: CPT | Performed by: OBSTETRICS & GYNECOLOGY

## 2024-09-17 ENCOUNTER — OFFICE VISIT (OUTPATIENT)
Dept: POSTPARTUM | Facility: CLINIC | Age: 29
End: 2024-09-17
Payer: COMMERCIAL

## 2024-09-17 PROCEDURE — 99215 OFFICE O/P EST HI 40 MIN: CPT | Performed by: PEDIATRICS

## 2024-09-17 NOTE — PROGRESS NOTES
BREAST FEEDING FOLLOW UP VISIT    Informant/Relationship: Gladys/mom    Discussion of General Lactation Issues: Sage struggled to attach in the hospital. Attachment is painful and Gladys is exclusively pumping and feeding her milk and formula. Breastfeeding was last attempted when they were last seen at [facility].   Gladys says that attachment was 6/10.    Gladys says he squeaks and gulps at the bottle, but does not do a lot of leaking. He is fed using paced bottle feeding.    Infant is 34 days old today.    Interval Breastfeeding History:    Frequency of breast feeding: none  Does mother feel breastfeeding is effective: If no, explain: he just bites  Does infant appear satisfied after nursing:If no, explain: no milk transfer  Stooling pattern normal:Yes  Urinating frequently:Yes  Using shield or shells:Shield tried in the hospital without benefit    Alternative/Artificial Feedings:   Bottle: Yes, exclusively paced bottle fed using either Dr. Overtons or Romario Jane, but Gladys is not sure of the nipple size  Cup: No  Syringe/Finger: No           Formula Type: Enfamil Gentlease                     Amount: 3-4 oz            Breast Milk:                      Amount: 3-4 oz             Frequency Q 2-3 Hr between feedings; most of the bottles are breast milk  Elimination Problems: No      Equipment:  Nipple Shield             Type: n/a             Size: n/a             Frequency of Use: n/a  Pump            Type: Tsrete S12 and Spectra (primarily uses the spectra)            Frequency of Use: about 4-5 times/day; collects 3-4 oz/session; up to 6 oz on first pump per day  Shells            Type: n/a            Frequency of use: n/a    Equipment Problems: no      Mom:  Breast: Normal, rounded shape  Nipple Assessment in General: Flat, slightly dimpled nipples that katie easily with stimulation  Mother's Awareness of Feeding Cues                 Recognizes: Yes                  Verbalizes: Yes  Support System: FOB,  extended family  History of Breastfeeding: none  Changes/Stressors/Violence: Concerns about difficulty attaching and transferring milk; she is concerned that Sage will not want to go back to the breast since he has been bottle fed.   Concerns/Goals: Gladys is unsure if she wishes to continue expressing milk or try to put Sage back to the breast    Problems with Mom: not making all the milk that Sage takes daily, but okay with this     Physical Exam  Constitutional:       Appearance: Normal appearance. She is well-developed and normal weight.   HENT:      Head: Normocephalic and atraumatic.   Eyes:      Extraocular Movements: Extraocular movements intact.   Neck:      Thyroid: No thyromegaly.   Cardiovascular:      Rate and Rhythm: Normal rate and regular rhythm.      Pulses: Normal pulses.      Heart sounds: Normal heart sounds. No murmur heard.  Pulmonary:      Effort: Pulmonary effort is normal.      Breath sounds: Normal breath sounds.   Musculoskeletal:      Cervical back: Normal range of motion and neck supple.   Lymphadenopathy:      Cervical: No cervical adenopathy.      Upper Body:      Right upper body: No pectoral adenopathy.      Left upper body: No pectoral adenopathy.   Neurological:      General: No focal deficit present.      Mental Status: She is alert and oriented to person, place, and time.   Psychiatric:         Mood and Affect: Mood normal.         Behavior: Behavior normal.         Thought Content: Thought content normal.         Judgment: Judgment normal.   Vitals and nursing note reviewed.         Infant:  Behaviors: Alert  Color: Healthy  Birth weight: 3.31 kg  Current weight: 4.13 kg    Problems with infant: Tongue tie      General Appearance:  Alert, active, no distress                             Head:  Normocephalic, AFOF, sutures opposed                             Eyes:  Conjunctiva clear, no drainage                              Ears:  Normally placed, no anomolies                              Nose:  Septum intact, no drainage or erythema                           Mouth:  No lesions; tongue extends to the lower lip and lateralizes bilaterally; there is very little lift of the tongue with crying; there is a little dimple in the tip of the tongue with movement; there is some cupping of the examiner's finger but the seal is easily broken with traction placed on the mandible; there is moderate peristalsis noted; the frenulum is short, thin, and inelastic with insertion at the tip of the tongue and at the crest of the inferior alveolar ridge.                    Neck:  Supple, symmetrical, trachea midline, no adenopathy; thyroid: no enlargement, symmetric, no tenderness/mass/nodules                 Respiratory:  No grunting, flaring, retractions, breath sounds clear and equal            Cardiovascular:  Regular rate and rhythm. No murmur. Adequate perfusion/capillary refill. Femoral pulse present                    Abdomen:   Soft, non-tender, no masses, bowel sounds present, no HSM             Genitourinary:  Normal male, testes descended, no discharge, swelling, or pain, anus patent                          Spine:   No abnormalities noted        Musculoskeletal:  Full range of motion          Skin/Hair/Nails:   Skin warm, dry, and intact, no rashes or abnormal dyspigmentation or lesions                Neurologic:   No abnormal movement, tone appropriate for gestational age    [place] Latch:  Efficiency:               Lips Flanged: Yes, on the bottle              Depth of latch: Good, on the bottle              Audible Swallow: Yes, sustained SSB on the bottle              Visible Milk: Yes, on the bottle              Wide Open/ Asymmetrical: Yes, on bottle              Suck Swallow Cycle: Breathing: Unlabored, Coordinated: Yes  Nipple Assessment after latch: N/a; baby only fed by bottle  Latch Problems: None on the bottle    Position:  Infant's Ergonomics/Body               Body Alignment: Yes                Head Supported: Yes               Close to Mom's body/ Lifted/ Supported: Yes               Mom's Ergonomics/Body: Yes                           Supported: Yes                           Sitting Back: Yes                           Brings Baby to her breast: n/a; baby only fed by bottle today  Positioning Problems: None for paced bottle feeding        Education:  Reviewed Frequency/Supply & Demand: Recommended continued feeding on demand  Reviewed Alternative/Artificial Feedings: Paced bottle feeding  Reviewed Mom/Breast care: Continue to express breast milk as often as feels comfortable; reviewed recommended frequency        Plan:  Discussed history and physical exams with Gladys. Support given for her commitment to providing breast milk for her baby. Discussed the findings on the baby's exam consistent with tongue tie and reviewed how this may be the cause of nipple trauma, nipple pain, nipple damage, poor milk transfer, blocked ducts, mastitis, and loss of milk production. Discussed the science that supports performing the frenotomy to improve latch.   Acknowledged Gladys's concerns regarding Aziel's willingness to return to the breast. Encouraged continued milk expression as often as Gladys feels she is able. Reviewed the recommendations for frequency of expression, but encouraged her to focus on what feels appropriate and doable for her. Additional support remains available.     I have spent 50 minutes with Patient  today in which greater than 50% of this time was spent in counseling/coordination of care regarding Prognosis, Risks and benefits of tx options, Instructions for management, Patient and family education, Importance of tx compliance, Risk factor reductions, Impressions, Counseling / Coordination of care, Documenting in the medical record, Reviewing / ordering tests, medicine, procedures  , and Obtaining or reviewing history  .

## 2024-09-17 NOTE — PATIENT INSTRUCTIONS
Continue to express breast milk as often as you feel you are able.     It is typically recommended to express breast milk about every 3 hours during the day with no more than a 5-6 hour break at night; and the more often you express, the more milk you will make. This is a recommendation, not a requirement.

## 2024-11-11 RX ORDER — NORETHINDRONE 0.35 MG/1
1 TABLET ORAL DAILY
Qty: 28 TABLET | Refills: 2 | Status: SHIPPED | OUTPATIENT
Start: 2024-11-11

## 2024-12-10 ENCOUNTER — TELEPHONE (OUTPATIENT)
Dept: FAMILY MEDICINE CLINIC | Facility: CLINIC | Age: 29
End: 2024-12-10

## 2024-12-10 NOTE — TELEPHONE ENCOUNTER
Called and left vm for pt to schedule annual physical. Pt canceled last physical. If pt calls back please schedule next avail for physical.

## 2024-12-16 ENCOUNTER — ANNUAL EXAM (OUTPATIENT)
Dept: OBGYN CLINIC | Facility: CLINIC | Age: 29
End: 2024-12-16
Payer: MEDICARE

## 2024-12-16 VITALS
SYSTOLIC BLOOD PRESSURE: 112 MMHG | WEIGHT: 134 LBS | HEIGHT: 62 IN | BODY MASS INDEX: 24.66 KG/M2 | DIASTOLIC BLOOD PRESSURE: 78 MMHG

## 2024-12-16 DIAGNOSIS — R87.610 ASCUS WITH POSITIVE HIGH RISK HPV CERVICAL: ICD-10-CM

## 2024-12-16 DIAGNOSIS — R87.810 ASCUS WITH POSITIVE HIGH RISK HPV CERVICAL: ICD-10-CM

## 2024-12-16 DIAGNOSIS — Z72.3 INADEQUATE EXERCISE: ICD-10-CM

## 2024-12-16 DIAGNOSIS — Z20.2 POSSIBLE EXPOSURE TO STD: ICD-10-CM

## 2024-12-16 DIAGNOSIS — E58 DIETARY CALCIUM DEFICIENCY: ICD-10-CM

## 2024-12-16 DIAGNOSIS — Z30.41 ORAL CONTRACEPTIVE PILL SURVEILLANCE: ICD-10-CM

## 2024-12-16 DIAGNOSIS — Z12.4 PAP SMEAR FOR CERVICAL CANCER SCREENING: ICD-10-CM

## 2024-12-16 DIAGNOSIS — Z01.419 ENCOUNTER FOR ANNUAL ROUTINE GYNECOLOGICAL EXAMINATION: Primary | ICD-10-CM

## 2024-12-16 PROBLEM — O99.013 ANEMIA, ANTEPARTUM, THIRD TRIMESTER: Status: RESOLVED | Noted: 2024-06-03 | Resolved: 2024-12-16

## 2024-12-16 PROBLEM — O42.90 LEAKAGE OF AMNIOTIC FLUID: Status: RESOLVED | Noted: 2024-08-14 | Resolved: 2024-12-16

## 2024-12-16 PROBLEM — R03.0 BLOOD PRESSURE ELEVATED WITHOUT HISTORY OF HTN: Status: RESOLVED | Noted: 2023-12-15 | Resolved: 2024-12-16

## 2024-12-16 PROCEDURE — 99395 PREV VISIT EST AGE 18-39: CPT | Performed by: OBSTETRICS & GYNECOLOGY

## 2024-12-16 PROCEDURE — G0143 SCR C/V CYTO,THINLAYER,RESCR: HCPCS | Performed by: OBSTETRICS & GYNECOLOGY

## 2024-12-16 PROCEDURE — 87591 N.GONORRHOEAE DNA AMP PROB: CPT | Performed by: OBSTETRICS & GYNECOLOGY

## 2024-12-16 PROCEDURE — G0476 HPV COMBO ASSAY CA SCREEN: HCPCS | Performed by: OBSTETRICS & GYNECOLOGY

## 2024-12-16 PROCEDURE — 87491 CHLMYD TRACH DNA AMP PROBE: CPT | Performed by: OBSTETRICS & GYNECOLOGY

## 2024-12-16 RX ORDER — DROSPIRENONE AND ETHINYL ESTRADIOL 0.02-3(28)
1 KIT ORAL DAILY
Qty: 28 TABLET | Refills: 3 | Status: SHIPPED | OUTPATIENT
Start: 2024-12-16

## 2024-12-16 NOTE — PROGRESS NOTES
Pt is a 29 y.o.  with Patient's last menstrual period was 2024 (exact date). using POP for BC presents for preventive care.   She notes the same partner since her last STI evaluation. In her lifetime she has been involved with  5  .   Safe sexual practices (monogomy) are followed consistently.         She does  feel safe in the relationship.  She does feel safe in her home.    Her calcium intake encompasses milk (cow, goat, almond, cashew, soy, etc) and cheese for a total of 1 servings daily on average.  She does not take additional Vitamin D (MVI or supplement).    She exercises  0  times per week.  Her menses consist of irregular almost daily spotting with POP. Now that she is done breast feeding, she would like a traditional PRANAY.    Menstrual History:  OB History          1    Para   1    Term   1       0    AB   0    Living   1         SAB   0    IAB   0    Ectopic   0    Multiple   0    Live Births   1           Obstetric Comments   Menarche 12      Menses: irregular spotting with POP              Menarche age: 12  Patient's last menstrual period was 2024 (exact date).          She has completed the HPV vaccine series appropriate for age.  tobacco use : does not use tobacco              Colonoscopy: not indicated  Mammogram: not indicated  Pap: 10/16/2023-ASCUS +HRHPV other, colpo 2024-ALLYSON I, repeat pap collected today  Pt desires ch/blue screening  Reviewed switching to traditional OCP she may experience breast tenderness, nausea, BTB until she adjusts to it. Reviewed risk of DVT/PE/MI and pt desires to proceed.     Past Medical History:   Diagnosis Date    ASCUS with positive high risk HPV cervical 12/15/2023    10/2023-ASCUS +HRHPV other; colpo 2024-LSIL, 2024-    Asthma     Dysmenorrhea     HPV vaccine counseling     completed series     Varicella vaccine        Past Surgical History:   Procedure Laterality Date    NO PAST SURGERIES         OB History     Para Term  AB Living   1 1 1 0 0 1   SAB IAB Ectopic Multiple Live Births   0 0 0 0 1      # Outcome Date GA Lbr Kyle/2nd Weight Sex Type Anes PTL Lv   1 Term 24 40w3d / 01:05 3310 g (7 lb 4.8 oz) M Vag-Spont EPI N ALEC      Obstetric Comments   Menarche 12         Menses: irregular spotting with POP            Current Outpatient Medications:     drospirenone-ethinyl estradiol (ANGELA) 3-0.02 MG per tablet, Take 1 tablet by mouth daily, Disp: 28 tablet, Rfl: 3    Allergies   Allergen Reactions    Cat Hair Extract     Dust Mite Extract     Other Allergic Rhinitis       Social History     Socioeconomic History    Marital status: Single     Spouse name: None    Number of children: 1    Years of education: None    Highest education level: Bachelor's degree (e.g., BA, AB, BS)   Occupational History    Occupation: Unemployed     Comment:    Tobacco Use    Smoking status: Never     Passive exposure: Never    Smokeless tobacco: Never   Vaping Use    Vaping status: Never Used   Substance and Sexual Activity    Alcohol use: Not Currently     Alcohol/week: 0.0 - 3.0 standard drinks of alcohol     Comment: Social    Drug use: Not Currently     Types: Marijuana     Comment: last use in 2023    Sexual activity: Yes     Partners: Male     Birth control/protection: Pill     Comment: lifetime partners: 5; current partner:    Other Topics Concern    None   Social History Narrative    Advent Preference: [employer] orthodox    Accepts blood products            Exercise: none    Calcium: 1 c milk 3-4x/week, 1 cheese 2-3x/week     Social Drivers of Health     Financial Resource Strain: Not on file   Food Insecurity: No Food Insecurity (2024)    Nursing - Inadequate Food Risk Classification     Worried About Running Out of Food in the Last Year: Never true     Ran Out of Food in the Last Year: Never true     Ran Out of Food in the Last Year: Not on file   Transportation Needs: No Transportation Needs  "(8/16/2024)    PRAPARE - Transportation     Lack of Transportation (Medical): No     Lack of Transportation (Non-Medical): No   Physical Activity: Not on file   Stress: Not on file   Social Connections: Not on file   Intimate Partner Violence: Not on file   Housing Stability: Low Risk  (8/16/2024)    Housing Stability Vital Sign     Unable to Pay for Housing in the Last Year: No     Number of Times Moved in the Last Year: 0     Homeless in the Last Year: No       Family History   Problem Relation Age of Onset    Hypertension Mother     Asthma Father     Other (Other) Father         pre-diabetes    No Known Problems Sister     Lung cancer Maternal Grandmother     Heart disease Maternal Grandmother     Hypertension Maternal Grandfather     Heart disease Paternal Grandmother     No Known Problems Paternal Grandfather     Diabetes Maternal Uncle     Breast cancer Neg Hx     Colon cancer Neg Hx     Ovarian cancer Neg Hx     Uterine cancer Neg Hx        Blood pressure 112/78, height 5' 2\" (1.575 m), weight 60.8 kg (134 lb), last menstrual period 11/05/2024, not currently breastfeeding. and Body mass index is 24.51 kg/m².    Physical Exam  Constitutional:       General: She is not in acute distress.     Appearance: Normal appearance. She is well-developed and normal weight. She is not ill-appearing.   HENT:      Head: Normocephalic and atraumatic.   Eyes:      Extraocular Movements: Extraocular movements intact.      Conjunctiva/sclera: Conjunctivae normal.   Neck:      Thyroid: No thyromegaly.      Trachea: No tracheal deviation.   Cardiovascular:      Rate and Rhythm: Normal rate and regular rhythm.      Heart sounds: Normal heart sounds.   Pulmonary:      Effort: Pulmonary effort is normal. No respiratory distress.      Breath sounds: Normal breath sounds. No stridor. No wheezing or rales.   Abdominal:      General: Bowel sounds are normal. There is no distension.      Palpations: Abdomen is soft. There is no mass.      " Tenderness: There is no abdominal tenderness. There is no guarding or rebound.      Hernia: No hernia is present.   Musculoskeletal:         General: No tenderness. Normal range of motion.      Cervical back: Normal range of motion and neck supple.   Lymphadenopathy:      Cervical: No cervical adenopathy.   Skin:     General: Skin is warm.      Findings: No erythema or rash.   Neurological:      Mental Status: She is alert and oriented to person, place, and time.   Psychiatric:         Mood and Affect: Mood normal.         Behavior: Behavior normal.         Thought Content: Thought content normal.         Judgment: Judgment normal.         Breasts: breasts appear normal, no suspicious masses, no skin or nipple changes or axillary nodes, symmetric fibrous changes in both upper outer quadrants.      vulva: normal external genitalia for age and no lesions, masses, epithelial changes, or exudate  vagina: color pink, rugae  well formed rugae, and bleeding  with a small amount of bleeding  cervix: parous and no lesions   uterus: NSSC, AF, NT, mobile  adnexa: no masses or tenderness      A/P:  Pt is a 29 y.o.  with      Gladys was seen today for gynecologic exam.    Diagnoses and all orders for this visit:    Encounter for annual routine gynecological examination  -stable examination  -pap updated today    Oral contraceptive pill surveillance  -     drospirenone-ethinyl estradiol (ANGELA) 3-0.02 MG per tablet; Take 1 tablet by mouth daily    ASCUS with positive high risk HPV cervical  -     Liquid-based pap, screening    Pap smear for cervical cancer screening  -     Liquid-based pap, screening    Possible exposure to STD  -     Chlamydia/GC amplified DNA by PCR    Dietary calcium deficiency  Patient advised recommendation of daily dietary calcium of 1000 mg calcium.     Inadequate exercise  Patient advised recommendation of exercise 5 times per week for 30 minutes.

## 2024-12-17 LAB
HPV HR 12 DNA CVX QL NAA+PROBE: NEGATIVE
HPV16 DNA CVX QL NAA+PROBE: NEGATIVE
HPV18 DNA CVX QL NAA+PROBE: NEGATIVE

## 2024-12-18 ENCOUNTER — RESULTS FOLLOW-UP (OUTPATIENT)
Dept: OBGYN CLINIC | Facility: CLINIC | Age: 29
End: 2024-12-18

## 2024-12-18 LAB
C TRACH DNA SPEC QL NAA+PROBE: NEGATIVE
N GONORRHOEA DNA SPEC QL NAA+PROBE: NEGATIVE

## 2024-12-23 LAB
LAB AP GYN PRIMARY INTERPRETATION: NORMAL
Lab: NORMAL

## 2025-01-02 ENCOUNTER — TELEPHONE (OUTPATIENT)
Dept: OBGYN CLINIC | Facility: CLINIC | Age: 30
End: 2025-01-02

## 2025-01-16 ENCOUNTER — OFFICE VISIT (OUTPATIENT)
Dept: OBGYN CLINIC | Facility: CLINIC | Age: 30
End: 2025-01-16

## 2025-01-16 VITALS
BODY MASS INDEX: 22.82 KG/M2 | SYSTOLIC BLOOD PRESSURE: 114 MMHG | WEIGHT: 124 LBS | DIASTOLIC BLOOD PRESSURE: 76 MMHG | HEIGHT: 62 IN

## 2025-01-16 DIAGNOSIS — R87.810 ASCUS WITH POSITIVE HIGH RISK HPV CERVICAL: Primary | ICD-10-CM

## 2025-01-16 DIAGNOSIS — R87.610 ASCUS WITH POSITIVE HIGH RISK HPV CERVICAL: Primary | ICD-10-CM

## 2025-01-16 PROCEDURE — G0145 SCR C/V CYTO,THINLAYER,RESCR: HCPCS | Performed by: NURSE PRACTITIONER

## 2025-01-16 PROCEDURE — REPAP: Performed by: NURSE PRACTITIONER

## 2025-01-16 NOTE — PROGRESS NOTES
"Assessment / Plan    Assessment & Plan  ASCUS with positive high risk HPV cervical  2024 HPV negative  Pap only collected.    Orders:    Liquid-based pap, screening        Subjective   PROBLEM VISIT     Gladys Church is a 29 y.o. female who presents for re--pap.    24 pap w/ insufficient cells and negative HPV.  10/2023 ASCUS/ pos other HPV.  2024 ALLYSON 1      Menstrual History:  OB History          1    Para   1    Term   1       0    AB   0    Living   1         SAB   0    IAB   0    Ectopic   0    Multiple   0    Live Births   1           Obstetric Comments   Menarche 12      Menses: irregular spotting with POP                Patient's last menstrual period was 01/10/2025 (exact date).       Review of Systems      Review of Systems    Objective     Visit Vitals  /76 (BP Location: Right arm, Patient Position: Sitting, Cuff Size: Standard)   Ht 5' 2\" (1.575 m)   Wt 56.2 kg (124 lb)   LMP 01/10/2025 (Exact Date)   BMI 22.68 kg/m²   OB Status Unknown   Smoking Status Never   BSA 1.56 m²      Chaperone present: Rosa Meadows NP student     /76 (BP Location: Right arm, Patient Position: Sitting, Cuff Size: Standard)   Ht 5' 2\" (1.575 m)   Wt 56.2 kg (124 lb)   LMP 01/10/2025 (Exact Date)   BMI 22.68 kg/m²   Physical Exam            "

## 2025-01-22 ENCOUNTER — RESULTS FOLLOW-UP (OUTPATIENT)
Dept: OBGYN CLINIC | Facility: CLINIC | Age: 30
End: 2025-01-22

## 2025-01-22 LAB
LAB AP GYN PRIMARY INTERPRETATION: NORMAL
Lab: NORMAL

## 2025-03-27 DIAGNOSIS — Z30.41 ORAL CONTRACEPTIVE PILL SURVEILLANCE: ICD-10-CM

## 2025-03-27 RX ORDER — DROSPIRENONE AND ETHINYL ESTRADIOL 0.02-3(28)
1 KIT ORAL DAILY
Qty: 28 TABLET | Refills: 0 | Status: SHIPPED | OUTPATIENT
Start: 2025-03-27

## 2025-04-23 ENCOUNTER — OFFICE VISIT (OUTPATIENT)
Age: 30
End: 2025-04-23

## 2025-04-23 VITALS
DIASTOLIC BLOOD PRESSURE: 72 MMHG | SYSTOLIC BLOOD PRESSURE: 104 MMHG | HEIGHT: 62 IN | BODY MASS INDEX: 19.77 KG/M2 | WEIGHT: 107.4 LBS

## 2025-04-23 DIAGNOSIS — Z30.41 ORAL CONTRACEPTIVE PILL SURVEILLANCE: Primary | ICD-10-CM

## 2025-04-23 RX ORDER — DROSPIRENONE AND ETHINYL ESTRADIOL 0.03MG-3MG
1 KIT ORAL DAILY
Qty: 28 TABLET | Refills: 2 | Status: SHIPPED | OUTPATIENT
Start: 2025-04-23

## 2025-04-23 NOTE — PROGRESS NOTES
Patient is a 30 y.o.  with Patient's last menstrual period was 2025 (exact date). who presents in follow up to initiation of WENDY due to AUB on POP    Pt reports she much prefers this pill. She is remembering to take it regularly. She denies any breast tenderness, nausea, abdominal discomfort or weight gain.    She reports she is very happy on this pill as overall her menses are regular and lighter than before. She does still have some break through bleeding, but much less than before. She notes about 3-4 days of spotting in week two of the pill    Reviewed we can try a higher dose estrogen based pill to see if we can stop the intermenstrual bleeding. She is interested and will follow up in 3 months. She will start her new medications after completion of this pack.     Past Medical History:   Diagnosis Date    ASCUS with positive high risk HPV cervical 12/15/2023    10/2023-ASCUS +HRHPV other; colpo 2024-LSIL, 2024-    Asthma     Dysmenorrhea     HPV vaccine counseling     completed series     Varicella vaccine        Past Surgical History:   Procedure Laterality Date    NO PAST SURGERIES         OB History    Para Term  AB Living   1 1 1 0 0 1   SAB IAB Ectopic Multiple Live Births   0 0 0 0 1      # Outcome Date GA Lbr Kyle/2nd Weight Sex Type Anes PTL Lv   1 Term 24 40w3d / 01:05 3310 g (7 lb 4.8 oz) M Vag-Spont EPI N ALEC      Obstetric Comments   Menarche 12         Menses: 28/3-4/   3-4 days of intermenstrual spotting week 2 of wendy           Current Outpatient Medications:     drospirenone-ethinyl estradiol (HADLEY) 3-0.03 MG per tablet, Take 1 tablet by mouth daily, Disp: 28 tablet, Rfl: 2    Allergies   Allergen Reactions    Cat Dander     Dust Mite Extract     Other Allergic Rhinitis       Social History     Socioeconomic History    Marital status: Single     Spouse name: Not on file    Number of children: 1    Years of education: Not on file    Highest education  level: Bachelor's degree (e.g., BA, AB, BS)   Occupational History    Occupation: Unemployed     Comment:    Tobacco Use    Smoking status: Never     Passive exposure: Never    Smokeless tobacco: Never   Vaping Use    Vaping status: Never Used   Substance and Sexual Activity    Alcohol use: Not Currently     Alcohol/week: 0.0 - 3.0 standard drinks of alcohol     Comment: Social    Drug use: Not Currently     Types: Marijuana     Comment: last use in December 2023    Sexual activity: Yes     Partners: Male     Birth control/protection: Pill     Comment: lifetime partners: 5; current partner: 2021   Other Topics Concern    Not on file   Social History Narrative    Mandaeism Preference: Mormon    Accepts blood products            Exercise: none    Calcium: 1 c milk 3-4x/week, 1 cheese 2-3x/week     Social Drivers of Health     Financial Resource Strain: Not on file   Food Insecurity: No Food Insecurity (8/16/2024)    Nursing - Inadequate Food Risk Classification     Worried About Running Out of Food in the Last Year: Never true     Ran Out of Food in the Last Year: Never true     Ran Out of Food in the Last Year: Not on file   Transportation Needs: No Transportation Needs (8/16/2024)    PRAPARE - Transportation     Lack of Transportation (Medical): No     Lack of Transportation (Non-Medical): No   Physical Activity: Not on file   Stress: Not on file   Social Connections: Not on file   Intimate Partner Violence: Not on file   Housing Stability: Low Risk  (8/16/2024)    Housing Stability Vital Sign     Unable to Pay for Housing in the Last Year: No     Number of Times Moved in the Last Year: 0     Homeless in the Last Year: No       Family History   Problem Relation Age of Onset    Hypertension Mother     Asthma Father     Other (Other) Father         pre-diabetes    No Known Problems Sister     Lung cancer Maternal Grandmother     Heart disease Maternal Grandmother     Hypertension Maternal Grandfather      "Heart disease Paternal Grandmother     No Known Problems Paternal Grandfather     Diabetes Maternal Uncle     Breast cancer Neg Hx     Colon cancer Neg Hx     Ovarian cancer Neg Hx     Uterine cancer Neg Hx        Review of Systems   Constitutional:  Negative for chills, fatigue, fever and unexpected weight change.   HENT:  Negative for congestion, mouth sores and sore throat.    Respiratory:  Negative for cough, chest tightness, shortness of breath and wheezing.    Cardiovascular:  Negative for chest pain and palpitations.   Gastrointestinal:  Negative for abdominal distention, abdominal pain, constipation, diarrhea, nausea and vomiting.   Endocrine: Negative for cold intolerance and heat intolerance.   Genitourinary:  Positive for menstrual problem (intermenstrual spotting week 2 of pill). Negative for dyspareunia, dysuria, genital sores, pelvic pain, vaginal bleeding, vaginal discharge and vaginal pain.   Musculoskeletal:  Negative for arthralgias.   Skin:  Negative for color change and rash.   Neurological:  Negative for dizziness, light-headedness and headaches.   Hematological:  Negative for adenopathy.       Blood pressure 104/72, height 5' 2\" (1.575 m), weight 48.7 kg (107 lb 6.4 oz), last menstrual period 04/04/2025, not currently breastfeeding. and Body mass index is 19.64 kg/m².    Physical Exam  Constitutional:       General: She is not in acute distress.     Appearance: Normal appearance. She is well-developed and normal weight. She is not ill-appearing.   HENT:      Head: Normocephalic and atraumatic.   Eyes:      Extraocular Movements: Extraocular movements intact.      Conjunctiva/sclera: Conjunctivae normal.   Neck:      Thyroid: No thyromegaly.      Trachea: No tracheal deviation.   Cardiovascular:      Rate and Rhythm: Normal rate and regular rhythm.      Heart sounds: Normal heart sounds.   Pulmonary:      Effort: Pulmonary effort is normal. No respiratory distress.      Breath sounds: Normal " breath sounds. No stridor. No wheezing or rales.   Abdominal:      General: Abdomen is flat. Bowel sounds are normal. There is no distension.      Palpations: Abdomen is soft. There is no mass.      Tenderness: There is no abdominal tenderness. There is no guarding or rebound.      Hernia: No hernia is present.   Musculoskeletal:         General: No tenderness. Normal range of motion.      Cervical back: Normal range of motion and neck supple.      Right lower leg: No edema.      Left lower leg: No edema.   Lymphadenopathy:      Cervical: No cervical adenopathy.   Skin:     General: Skin is warm.      Findings: No erythema or rash.   Neurological:      Mental Status: She is alert and oriented to person, place, and time.   Psychiatric:         Mood and Affect: Mood normal.         Behavior: Behavior normal.         Thought Content: Thought content normal.         Judgment: Judgment normal.             A/P:  Pt is a 30 y.o.  with      Gladys was seen today for follow-up.    Diagnoses and all orders for this visit:    Oral contraceptive pill surveillance  -     drospirenone-ethinyl estradiol (MARELY) 3-0.03 MG per tablet; Take 1 tablet by mouth daily      Stop curtis after completion of this pack and star Marely  F/u 3 months

## 2025-05-27 DIAGNOSIS — Z30.41 ORAL CONTRACEPTIVE PILL SURVEILLANCE: ICD-10-CM

## 2025-05-28 RX ORDER — DROSPIRENONE AND ETHINYL ESTRADIOL 0.03MG-3MG
1 KIT ORAL DAILY
Qty: 28 TABLET | Refills: 0 | OUTPATIENT
Start: 2025-05-28

## 2025-05-28 NOTE — TELEPHONE ENCOUNTER
Patient calling to report receiving denial for her refill request. Reviewed current Rx has additional refills and advised she contact pharmacy to request refill. Patient verbalizes understanding.

## 2025-07-20 DIAGNOSIS — Z30.41 ORAL CONTRACEPTIVE PILL SURVEILLANCE: ICD-10-CM

## 2025-07-21 DIAGNOSIS — Z30.41 ORAL CONTRACEPTIVE PILL SURVEILLANCE: ICD-10-CM

## 2025-07-21 NOTE — TELEPHONE ENCOUNTER
Medication: drospirenone-ethinyl estradiol (HADLEY) 3-0.03 MG per tablet     Dose/Frequency: Take 1 tablet by mouth daily     Quantity: 28    Pharmacy: [facility]/pharmacy #[id number] - [place] - [facility] [place] [address] [phone]     Office:   [] PCP/Provider -   [x] Speciality/Provider -     Does the patient have enough for 3 days?   [x] Yes   [] No - Send as HP to POD

## 2025-07-23 RX ORDER — DROSPIRENONE AND ETHINYL ESTRADIOL 0.03MG-3MG
1 KIT ORAL DAILY
Qty: 28 TABLET | Refills: 2 | Status: SHIPPED | OUTPATIENT
Start: 2025-07-23

## 2025-07-24 ENCOUNTER — TELEPHONE (OUTPATIENT)
Age: 30
End: 2025-07-24

## 2025-07-24 RX ORDER — DROSPIRENONE AND ETHINYL ESTRADIOL 0.03MG-3MG
1 KIT ORAL DAILY
Qty: 28 TABLET | Refills: 0 | OUTPATIENT
Start: 2025-07-24